# Patient Record
Sex: FEMALE | Race: BLACK OR AFRICAN AMERICAN | NOT HISPANIC OR LATINO | URBAN - METROPOLITAN AREA
[De-identification: names, ages, dates, MRNs, and addresses within clinical notes are randomized per-mention and may not be internally consistent; named-entity substitution may affect disease eponyms.]

---

## 2019-04-22 ENCOUNTER — OUTPATIENT (OUTPATIENT)
Dept: OUTPATIENT SERVICES | Facility: HOSPITAL | Age: 49
LOS: 1 days | End: 2019-04-22

## 2019-04-22 VITALS
HEIGHT: 63.5 IN | TEMPERATURE: 98 F | RESPIRATION RATE: 16 BRPM | DIASTOLIC BLOOD PRESSURE: 76 MMHG | WEIGHT: 235.01 LBS | SYSTOLIC BLOOD PRESSURE: 120 MMHG | HEART RATE: 68 BPM

## 2019-04-22 DIAGNOSIS — K43.2 INCISIONAL HERNIA WITHOUT OBSTRUCTION OR GANGRENE: ICD-10-CM

## 2019-04-22 DIAGNOSIS — Z98.890 OTHER SPECIFIED POSTPROCEDURAL STATES: Chronic | ICD-10-CM

## 2019-04-22 DIAGNOSIS — Z98.891 HISTORY OF UTERINE SCAR FROM PREVIOUS SURGERY: Chronic | ICD-10-CM

## 2019-04-22 LAB
ANION GAP SERPL CALC-SCNC: 10 MMO/L — SIGNIFICANT CHANGE UP (ref 7–14)
BLD GP AB SCN SERPL QL: NEGATIVE — SIGNIFICANT CHANGE UP
BUN SERPL-MCNC: 8 MG/DL — SIGNIFICANT CHANGE UP (ref 7–23)
CALCIUM SERPL-MCNC: 9.4 MG/DL — SIGNIFICANT CHANGE UP (ref 8.4–10.5)
CHLORIDE SERPL-SCNC: 104 MMOL/L — SIGNIFICANT CHANGE UP (ref 98–107)
CO2 SERPL-SCNC: 25 MMOL/L — SIGNIFICANT CHANGE UP (ref 22–31)
CREAT SERPL-MCNC: 0.86 MG/DL — SIGNIFICANT CHANGE UP (ref 0.5–1.3)
GLUCOSE SERPL-MCNC: 91 MG/DL — SIGNIFICANT CHANGE UP (ref 70–99)
HCG SERPL-ACNC: < 5 MIU/ML — SIGNIFICANT CHANGE UP
HCT VFR BLD CALC: 38.7 % — SIGNIFICANT CHANGE UP (ref 34.5–45)
HGB BLD-MCNC: 11.9 G/DL — SIGNIFICANT CHANGE UP (ref 11.5–15.5)
MCHC RBC-ENTMCNC: 25.9 PG — LOW (ref 27–34)
MCHC RBC-ENTMCNC: 30.7 % — LOW (ref 32–36)
MCV RBC AUTO: 84.1 FL — SIGNIFICANT CHANGE UP (ref 80–100)
NRBC # FLD: 0 K/UL — SIGNIFICANT CHANGE UP (ref 0–0)
PLATELET # BLD AUTO: 223 K/UL — SIGNIFICANT CHANGE UP (ref 150–400)
PMV BLD: 11.9 FL — SIGNIFICANT CHANGE UP (ref 7–13)
POTASSIUM SERPL-MCNC: 4 MMOL/L — SIGNIFICANT CHANGE UP (ref 3.5–5.3)
POTASSIUM SERPL-SCNC: 4 MMOL/L — SIGNIFICANT CHANGE UP (ref 3.5–5.3)
RBC # BLD: 4.6 M/UL — SIGNIFICANT CHANGE UP (ref 3.8–5.2)
RBC # FLD: 13.4 % — SIGNIFICANT CHANGE UP (ref 10.3–14.5)
RH IG SCN BLD-IMP: POSITIVE — SIGNIFICANT CHANGE UP
SODIUM SERPL-SCNC: 139 MMOL/L — SIGNIFICANT CHANGE UP (ref 135–145)
WBC # BLD: 5.48 K/UL — SIGNIFICANT CHANGE UP (ref 3.8–10.5)
WBC # FLD AUTO: 5.48 K/UL — SIGNIFICANT CHANGE UP (ref 3.8–10.5)

## 2019-04-22 NOTE — H&P PST ADULT - NEGATIVE ENMT SYMPTOMS
no ear pain/no tinnitus/no throat pain/no sinus symptoms/no dysphagia/no vertigo/no nasal congestion/no hearing difficulty

## 2019-04-22 NOTE — H&P PST ADULT - HISTORY OF PRESENT ILLNESS
Pt is a 49 yr old female scheduled for Abdominal Wall Component Separation Repair wtih Mesh and flap with Dr Lazcano and Dr Martin for c/o of recurring incisional hernia . Pt had hernia repair 4/18 and noted pain that started 8/18 with bulging and increased pain 12/18. Pt now to have surgery for repeat repair.

## 2019-04-22 NOTE — H&P PST ADULT - GASTROINTESTINAL COMMENTS
Pt c/o of abdominal pain r/t incisional hernia since 8/18 with repair 4/18 - pt states bulging since 12/18 and increased pain Pt c/o of slight discomfort with palpation and pressure over surgical incision / umbilical region. Pt c/o of abdominal pain r/t incisional hernia since 8/18 with repair 4/18 - pt states bulging since 12/18 and increased pain- pt denies GI symptoms

## 2019-04-22 NOTE — H&P PST ADULT - NSICDXPASTMEDICALHX_GEN_ALL_CORE_FT
PAST MEDICAL HISTORY:  Incisional hernia     Obesity PAST MEDICAL HISTORY:  Fibroid uterus     Incisional hernia     Obesity

## 2019-04-22 NOTE — H&P PST ADULT - NEGATIVE NEUROLOGICAL SYMPTOMS
no focal seizures/no paresthesias/no syncope/no transient paralysis/no weakness/no generalized seizures

## 2019-04-22 NOTE — H&P PST ADULT - NSICDXPROBLEM_GEN_ALL_CORE_FT
PROBLEM DIAGNOSES  Problem: Incisional hernia  Assessment and Plan: Pt scheduled for surgery and verbalized understanding of preop instructions for Famotidine and Chlorhexidine  OR Booking notified of risk for sleep apnea and mesh and breast marker  Pt to see PCP for MC as per surgeon

## 2019-05-01 ENCOUNTER — TRANSCRIPTION ENCOUNTER (OUTPATIENT)
Age: 49
End: 2019-05-01

## 2019-05-02 ENCOUNTER — RESULT REVIEW (OUTPATIENT)
Age: 49
End: 2019-05-02

## 2019-05-02 ENCOUNTER — INPATIENT (INPATIENT)
Facility: HOSPITAL | Age: 49
LOS: 4 days | Discharge: HOME | End: 2019-05-07
Attending: SPECIALIST | Admitting: SPECIALIST
Payer: COMMERCIAL

## 2019-05-02 VITALS
HEIGHT: 63.5 IN | RESPIRATION RATE: 16 BRPM | OXYGEN SATURATION: 98 % | HEART RATE: 58 BPM | TEMPERATURE: 99 F | SYSTOLIC BLOOD PRESSURE: 126 MMHG | DIASTOLIC BLOOD PRESSURE: 72 MMHG | WEIGHT: 235.01 LBS

## 2019-05-02 DIAGNOSIS — Z98.890 OTHER SPECIFIED POSTPROCEDURAL STATES: Chronic | ICD-10-CM

## 2019-05-02 DIAGNOSIS — S31.105S UNSPECIFIED OPEN WOUND OF ABDOMINAL WALL, PERIUMBILIC REGION WITHOUT PENETRATION INTO PERITONEAL CAVITY, SEQUELA: ICD-10-CM

## 2019-05-02 DIAGNOSIS — Z98.891 HISTORY OF UTERINE SCAR FROM PREVIOUS SURGERY: Chronic | ICD-10-CM

## 2019-05-02 PROBLEM — K43.2 INCISIONAL HERNIA WITHOUT OBSTRUCTION OR GANGRENE: Chronic | Status: ACTIVE | Noted: 2019-04-22

## 2019-05-02 PROBLEM — E66.9 OBESITY, UNSPECIFIED: Chronic | Status: ACTIVE | Noted: 2019-04-22

## 2019-05-02 PROBLEM — D25.9 LEIOMYOMA OF UTERUS, UNSPECIFIED: Chronic | Status: ACTIVE | Noted: 2019-04-22

## 2019-05-02 LAB
HCG UR QL: NEGATIVE — SIGNIFICANT CHANGE UP
RH IG SCN BLD-IMP: POSITIVE — SIGNIFICANT CHANGE UP

## 2019-05-02 PROCEDURE — 88302 TISSUE EXAM BY PATHOLOGIST: CPT | Mod: 26

## 2019-05-02 RX ORDER — ACETAMINOPHEN 500 MG
1000 TABLET ORAL ONCE
Qty: 0 | Refills: 0 | Status: COMPLETED | OUTPATIENT
Start: 2019-05-03 | End: 2019-05-03

## 2019-05-02 RX ORDER — ACETAMINOPHEN 500 MG
1000 TABLET ORAL ONCE
Qty: 0 | Refills: 0 | Status: COMPLETED | OUTPATIENT
Start: 2019-05-02 | End: 2019-05-02

## 2019-05-02 RX ORDER — MORPHINE SULFATE 50 MG/1
2 CAPSULE, EXTENDED RELEASE ORAL EVERY 4 HOURS
Qty: 0 | Refills: 0 | Status: DISCONTINUED | OUTPATIENT
Start: 2019-05-02 | End: 2019-05-03

## 2019-05-02 RX ORDER — HYDROMORPHONE HYDROCHLORIDE 2 MG/ML
0.25 INJECTION INTRAMUSCULAR; INTRAVENOUS; SUBCUTANEOUS
Qty: 0 | Refills: 0 | Status: DISCONTINUED | OUTPATIENT
Start: 2019-05-02 | End: 2019-05-02

## 2019-05-02 RX ORDER — ENOXAPARIN SODIUM 100 MG/ML
40 INJECTION SUBCUTANEOUS EVERY 24 HOURS
Qty: 0 | Refills: 0 | Status: DISCONTINUED | OUTPATIENT
Start: 2019-05-02 | End: 2019-05-07

## 2019-05-02 RX ORDER — CEFOTETAN DISODIUM 1 G
2 VIAL (EA) INJECTION EVERY 12 HOURS
Qty: 0 | Refills: 0 | Status: DISCONTINUED | OUTPATIENT
Start: 2019-05-02 | End: 2019-05-07

## 2019-05-02 RX ORDER — SODIUM CHLORIDE 9 MG/ML
1000 INJECTION, SOLUTION INTRAVENOUS
Qty: 0 | Refills: 0 | Status: DISCONTINUED | OUTPATIENT
Start: 2019-05-02 | End: 2019-05-04

## 2019-05-02 RX ADMIN — MORPHINE SULFATE 2 MILLIGRAM(S): 50 CAPSULE, EXTENDED RELEASE ORAL at 21:41

## 2019-05-02 RX ADMIN — HYDROMORPHONE HYDROCHLORIDE 0.25 MILLIGRAM(S): 2 INJECTION INTRAMUSCULAR; INTRAVENOUS; SUBCUTANEOUS at 20:30

## 2019-05-02 RX ADMIN — HYDROMORPHONE HYDROCHLORIDE 0.25 MILLIGRAM(S): 2 INJECTION INTRAMUSCULAR; INTRAVENOUS; SUBCUTANEOUS at 20:45

## 2019-05-02 RX ADMIN — HYDROMORPHONE HYDROCHLORIDE 0.25 MILLIGRAM(S): 2 INJECTION INTRAMUSCULAR; INTRAVENOUS; SUBCUTANEOUS at 20:15

## 2019-05-02 RX ADMIN — MORPHINE SULFATE 2 MILLIGRAM(S): 50 CAPSULE, EXTENDED RELEASE ORAL at 21:56

## 2019-05-02 RX ADMIN — ENOXAPARIN SODIUM 40 MILLIGRAM(S): 100 INJECTION SUBCUTANEOUS at 23:49

## 2019-05-02 RX ADMIN — Medication 400 MILLIGRAM(S): at 23:50

## 2019-05-02 NOTE — PROGRESS NOTE ADULT - SUBJECTIVE AND OBJECTIVE BOX
Surgery Post-Operative Note    Procedure: Repair of incisional hernia using mesh  Reconstruction of abdominal wall using flap    Subjective: Patient examined at bedside. Reports having some pain over R breast, tender to palpation, also reports SOB, O2 sat: 98%. No nausea/vomiting. No other complaints at this time.    Vital Signs Last 24 Hrs  T(C): 36.9 (02 May 2019 23:00), Max: 37.3 (02 May 2019 11:35)  T(F): 98.4 (02 May 2019 23:00), Max: 99.1 (02 May 2019 11:35)  HR: 70 (02 May 2019 23:00) (58 - 83)  BP: 144/77 (02 May 2019 23:00) (109/73 - 144/77)  BP(mean): 84 (02 May 2019 22:00) (80 - 95)  RR: 20 (02 May 2019 23:00) (9 - 25)  SpO2: 98% (02 May 2019 23:00) (95% - 100%)    Physical Exam:  General: NAD, resting comfortably in bed  Pulmonary: Nonlabored breathing, no respiratory distress  Cardiovascular: NSR  Abdominal: soft, ND, appropriately tender to palpation, no RT, no guarding, prevena vac in place, 2 flank drains on each side, 1 drain on L side (medial) all with SS output  : Coello in place with clear urine    LABS:            CAPILLARY BLOOD GLUCOSE            ABO Interpretation: A (05-02 @ 11:51)        Radiology and Additional Studies:

## 2019-05-02 NOTE — BRIEF OPERATIVE NOTE - NSICDXBRIEFPROCEDURE_GEN_ALL_CORE_FT
PROCEDURES:  Reconstruction of abdominal wall using flap 02-May-2019 15:13:55 Bilateral component separation and skin advancement flaps Geneva Child
PROCEDURES:  Repair of incisional hernia using mesh 02-May-2019 19:04:55  Alize Friedman

## 2019-05-02 NOTE — BRIEF OPERATIVE NOTE - NSICDXBRIEFPOSTOP_GEN_ALL_CORE_FT
POST-OP DIAGNOSIS:  Recurrent incisional hernia 02-May-2019 15:14:29  Geneva Child
POST-OP DIAGNOSIS:  Recurrent incisional hernia 02-May-2019 15:14:29  Geneva Child

## 2019-05-02 NOTE — BRIEF OPERATIVE NOTE - NSICDXBRIEFPREOP_GEN_ALL_CORE_FT
PRE-OP DIAGNOSIS:  Recurrent incisional hernia 02-May-2019 15:14:20  Geneva Child
PRE-OP DIAGNOSIS:  Recurrent incisional hernia 02-May-2019 15:14:20  Geneva Child
WDL

## 2019-05-02 NOTE — BRIEF OPERATIVE NOTE - OPERATION/FINDINGS
Incisional hernia dissected out, approx 15 cm diameter. Hernia sac with adherent small bowel to previous mesh was carefully . 2 serosal tears were repaired with interrupted 3-0 silk sutures. An enterotomy was fixed in two layers with 3-0 Chromic and an outer layer of interrupted 3-0 silk lemberts. It was decided to close the abdomen with Strattice in a retrorectus repair. The posterior sheath was closed with #1 Prolene. 14 cm x 25 cm piece of Strattice was laid on the posterior sheath and sutured in place with interrupted #1 Prolene U-stitches. The anterior sheath was closed on top. A single drain was left in the subfascial space. 2 JPs were placed in the subcutaneous space. An advancement flap was made to close the skin.

## 2019-05-02 NOTE — BRIEF OPERATIVE NOTE - OPERATION/FINDINGS
see op note Incisional hernia repair. Lysis of adhesions. B/l components separation. Umbilicoplasty with bi-pedicaled flat, abdominal closure with ariadne de lis skin advancement flap

## 2019-05-02 NOTE — PROGRESS NOTE ADULT - ASSESSMENT
Assessment:49y Female s/p repair of incisional hernia using mesh and reconstruction of abdominal wall using flap (5/2)      Plan:  - EKG  - Pain control   - NPO/IVF  - F/u drain output  - Monitor abdominal exam  - OOB as tolerated    A Team Surgery, [id number] Assessment:49y Female w/ hx of hernia repair 4/18 now s/p revision repair of incisional hernia using mesh and reconstruction of abdominal wall using flap (5/2)      Plan:  - EKG  - Pain control   - NPO/IVF  - F/u drain output  - Monitor abdominal exam  - OOB as tolerated    A Team Surgery, [id number]

## 2019-05-03 LAB
ANION GAP SERPL CALC-SCNC: 12 MMO/L — SIGNIFICANT CHANGE UP (ref 7–14)
BASOPHILS # BLD AUTO: 0.02 K/UL — SIGNIFICANT CHANGE UP (ref 0–0.2)
BASOPHILS NFR BLD AUTO: 0.2 % — SIGNIFICANT CHANGE UP (ref 0–2)
BUN SERPL-MCNC: 9 MG/DL — SIGNIFICANT CHANGE UP (ref 7–23)
CALCIUM SERPL-MCNC: 8.5 MG/DL — SIGNIFICANT CHANGE UP (ref 8.4–10.5)
CHLORIDE SERPL-SCNC: 102 MMOL/L — SIGNIFICANT CHANGE UP (ref 98–107)
CO2 SERPL-SCNC: 23 MMOL/L — SIGNIFICANT CHANGE UP (ref 22–31)
CREAT SERPL-MCNC: 0.87 MG/DL — SIGNIFICANT CHANGE UP (ref 0.5–1.3)
EOSINOPHIL # BLD AUTO: 0 K/UL — SIGNIFICANT CHANGE UP (ref 0–0.5)
EOSINOPHIL NFR BLD AUTO: 0 % — SIGNIFICANT CHANGE UP (ref 0–6)
GLUCOSE SERPL-MCNC: 136 MG/DL — HIGH (ref 70–99)
HCT VFR BLD CALC: 34.6 % — SIGNIFICANT CHANGE UP (ref 34.5–45)
HGB BLD-MCNC: 11 G/DL — LOW (ref 11.5–15.5)
IMM GRANULOCYTES NFR BLD AUTO: 0.5 % — SIGNIFICANT CHANGE UP (ref 0–1.5)
LYMPHOCYTES # BLD AUTO: 1.33 K/UL — SIGNIFICANT CHANGE UP (ref 1–3.3)
LYMPHOCYTES # BLD AUTO: 10.3 % — LOW (ref 13–44)
MAGNESIUM SERPL-MCNC: 1.8 MG/DL — SIGNIFICANT CHANGE UP (ref 1.6–2.6)
MCHC RBC-ENTMCNC: 26.2 PG — LOW (ref 27–34)
MCHC RBC-ENTMCNC: 31.8 % — LOW (ref 32–36)
MCV RBC AUTO: 82.4 FL — SIGNIFICANT CHANGE UP (ref 80–100)
MONOCYTES # BLD AUTO: 0.58 K/UL — SIGNIFICANT CHANGE UP (ref 0–0.9)
MONOCYTES NFR BLD AUTO: 4.5 % — SIGNIFICANT CHANGE UP (ref 2–14)
NEUTROPHILS # BLD AUTO: 10.95 K/UL — HIGH (ref 1.8–7.4)
NEUTROPHILS NFR BLD AUTO: 84.5 % — HIGH (ref 43–77)
NRBC # FLD: 0 K/UL — SIGNIFICANT CHANGE UP (ref 0–0)
PHOSPHATE SERPL-MCNC: 2.6 MG/DL — SIGNIFICANT CHANGE UP (ref 2.5–4.5)
PLATELET # BLD AUTO: 197 K/UL — SIGNIFICANT CHANGE UP (ref 150–400)
PMV BLD: 12 FL — SIGNIFICANT CHANGE UP (ref 7–13)
POTASSIUM SERPL-MCNC: 3.9 MMOL/L — SIGNIFICANT CHANGE UP (ref 3.5–5.3)
POTASSIUM SERPL-SCNC: 3.9 MMOL/L — SIGNIFICANT CHANGE UP (ref 3.5–5.3)
RBC # BLD: 4.2 M/UL — SIGNIFICANT CHANGE UP (ref 3.8–5.2)
RBC # FLD: 13.8 % — SIGNIFICANT CHANGE UP (ref 10.3–14.5)
SODIUM SERPL-SCNC: 137 MMOL/L — SIGNIFICANT CHANGE UP (ref 135–145)
WBC # BLD: 12.94 K/UL — HIGH (ref 3.8–10.5)
WBC # FLD AUTO: 12.94 K/UL — HIGH (ref 3.8–10.5)

## 2019-05-03 PROCEDURE — 93010 ELECTROCARDIOGRAM REPORT: CPT

## 2019-05-03 RX ORDER — ACETAMINOPHEN 500 MG
1000 TABLET ORAL ONCE
Qty: 0 | Refills: 0 | Status: COMPLETED | OUTPATIENT
Start: 2019-05-03 | End: 2019-05-03

## 2019-05-03 RX ORDER — MORPHINE SULFATE 50 MG/1
4 CAPSULE, EXTENDED RELEASE ORAL EVERY 4 HOURS
Qty: 0 | Refills: 0 | Status: DISCONTINUED | OUTPATIENT
Start: 2019-05-03 | End: 2019-05-04

## 2019-05-03 RX ORDER — ACETAMINOPHEN 500 MG
1000 TABLET ORAL ONCE
Qty: 0 | Refills: 0 | Status: COMPLETED | OUTPATIENT
Start: 2019-05-04 | End: 2019-05-04

## 2019-05-03 RX ORDER — HYDROMORPHONE HYDROCHLORIDE 2 MG/ML
0.5 INJECTION INTRAMUSCULAR; INTRAVENOUS; SUBCUTANEOUS ONCE
Qty: 0 | Refills: 0 | Status: DISCONTINUED | OUTPATIENT
Start: 2019-05-03 | End: 2019-05-03

## 2019-05-03 RX ADMIN — MORPHINE SULFATE 2 MILLIGRAM(S): 50 CAPSULE, EXTENDED RELEASE ORAL at 09:42

## 2019-05-03 RX ADMIN — MORPHINE SULFATE 2 MILLIGRAM(S): 50 CAPSULE, EXTENDED RELEASE ORAL at 10:21

## 2019-05-03 RX ADMIN — MORPHINE SULFATE 4 MILLIGRAM(S): 50 CAPSULE, EXTENDED RELEASE ORAL at 20:03

## 2019-05-03 RX ADMIN — ENOXAPARIN SODIUM 40 MILLIGRAM(S): 100 INJECTION SUBCUTANEOUS at 23:07

## 2019-05-03 RX ADMIN — MORPHINE SULFATE 2 MILLIGRAM(S): 50 CAPSULE, EXTENDED RELEASE ORAL at 14:45

## 2019-05-03 RX ADMIN — Medication 100 GRAM(S): at 17:19

## 2019-05-03 RX ADMIN — Medication 400 MILLIGRAM(S): at 23:07

## 2019-05-03 RX ADMIN — MORPHINE SULFATE 2 MILLIGRAM(S): 50 CAPSULE, EXTENDED RELEASE ORAL at 05:40

## 2019-05-03 RX ADMIN — HYDROMORPHONE HYDROCHLORIDE 0.5 MILLIGRAM(S): 2 INJECTION INTRAMUSCULAR; INTRAVENOUS; SUBCUTANEOUS at 15:00

## 2019-05-03 RX ADMIN — Medication 100 GRAM(S): at 05:27

## 2019-05-03 RX ADMIN — MORPHINE SULFATE 2 MILLIGRAM(S): 50 CAPSULE, EXTENDED RELEASE ORAL at 14:18

## 2019-05-03 RX ADMIN — Medication 1000 MILLIGRAM(S): at 00:20

## 2019-05-03 RX ADMIN — MORPHINE SULFATE 2 MILLIGRAM(S): 50 CAPSULE, EXTENDED RELEASE ORAL at 05:24

## 2019-05-03 RX ADMIN — Medication 400 MILLIGRAM(S): at 17:25

## 2019-05-03 RX ADMIN — SODIUM CHLORIDE 100 MILLILITER(S): 9 INJECTION, SOLUTION INTRAVENOUS at 11:31

## 2019-05-03 RX ADMIN — HYDROMORPHONE HYDROCHLORIDE 0.5 MILLIGRAM(S): 2 INJECTION INTRAMUSCULAR; INTRAVENOUS; SUBCUTANEOUS at 14:35

## 2019-05-03 RX ADMIN — Medication 400 MILLIGRAM(S): at 06:39

## 2019-05-03 RX ADMIN — Medication 1000 MILLIGRAM(S): at 07:09

## 2019-05-03 RX ADMIN — Medication 1000 MILLIGRAM(S): at 12:00

## 2019-05-03 RX ADMIN — Medication 1000 MILLIGRAM(S): at 23:37

## 2019-05-03 RX ADMIN — Medication 400 MILLIGRAM(S): at 11:21

## 2019-05-03 RX ADMIN — Medication 1000 MILLIGRAM(S): at 18:00

## 2019-05-03 NOTE — PROGRESS NOTE ADULT - ASSESSMENT
Poorly controlled post-operative pain - S/P repair of large incisional hernia.  Will require an additional day of hospital stay  To monitor response to narcotic - may need increase in dose.  To advance diet in the morning.

## 2019-05-03 NOTE — PHYSICAL THERAPY INITIAL EVALUATION ADULT - DISCHARGE DISPOSITION, PT EVAL
Anticipate Restorative Rehab to improve functional mobility and strength and to return to baseline functional status.

## 2019-05-03 NOTE — PATIENT PROFILE ADULT - SURGICAL SITE DESCRIPTION
Assessment/Plan:    Osteoarthritis of right acromioclavicular joint  This continues to have some symptoms  Would recommend follow-up with Orthopedics  Hypertension  Blood pressure today is reasonable, no changes  Follow-up in the future  Hyperlipidemia  Cholesterol is not at goal   Would recommend limit carbohydrate and cholesterol intake  Patient did not wish to take statins  Will re-evaluate in approximately 1 year  Elevated prostate specific antigen (PSA)  PSA is improved on testing 6 months after initial   Recommend recheck in 6 months, and SINDY at that time  Hyperglycemia  Blood sugar was only minimally elevated  Would recommend limit carbohydrate intake, continue exercise that he is currently doing, follow-up in 1 year  Diagnoses and all orders for this visit:    Osteoarthritis of right acromioclavicular joint    Mixed hyperlipidemia  -     Comprehensive metabolic panel; Future  -     Lipid panel; Future    Essential hypertension  -     Comprehensive metabolic panel; Future    Elevated prostate specific antigen (PSA)  -     PSA Total, Diagnostic; Future    Ankle swelling, left  Comments:  Gone now  No signs of DVT or circulation issues  Follow in future  Screen for colon cancer  -     Ambulatory referral to Gastroenterology; Future    Hyperglycemia          Subjective: Follow up to chronic conditions and review bw results  Continues with shoulder pain  Sees Ortho  Also ankle was swollen last week and took his wife's water pill to help   mjs     Patient ID: Cecily Guerra is a 74 y o  male  He has had some left ankle swelling, so took Lasix, and that improved a bit  It seemed to decrease after the lasix for 2 days  Has not happened before that he recalls, but maybe one other time  Cold symptoms last week, but his throat was OK with this  Shoulder pain: Still seeing Ortho  Has had injections  Shoulder has AC arthritis, and subacromial impingement      PSA: Had repeat test after slight rise 6 months ago  First cholesterol, the patient is off statins  He did not wish to continue them previously  He did mention that he had some increased sweets in takes recently  The following portions of the patient's history were reviewed and updated as appropriate: allergies, current medications, past family history, past medical history, past social history, past surgical history and problem list     Review of Systems   HENT: Negative  Respiratory: Negative  Cardiovascular: Negative  Musculoskeletal:        Per HPI  All other systems reviewed and are negative  Laboratory studies reviewed  Please see copies on the chart  Sodium and potassium normal   Creatinine 1 66, GFR 61  Blood sugar 101  AST 13, ALT 26  Total cholesterol 235, , HDL 53, triglycerides 138  PSA 1 8  Objective:      /84   Pulse 76   Ht 5' 8" (1 727 m)   Wt 77 1 kg (170 lb)   BMI 25 85 kg/m²          Physical Exam   Constitutional: He appears well-developed and well-nourished  HENT:   Head: Normocephalic and atraumatic  Neck: Normal range of motion  Neck supple  Cardiovascular: Normal rate, regular rhythm and normal heart sounds  Exam reveals no gallop and no friction rub  No murmur heard  Pulses:       Carotid pulses are 2+ on the right side, and 2+ on the left side  Dorsalis pedis pulses are 2+ on the left side  Posterior tibial pulses are 2+ on the left side  Pulmonary/Chest: Effort normal and breath sounds normal  No respiratory distress  He has no wheezes  He has no rales  Nursing note and vitals reviewed  transverse abd black foam purvina dressing with tegaderm

## 2019-05-03 NOTE — PROGRESS NOTE ADULT - SUBJECTIVE AND OBJECTIVE BOX
Surgery Progress Note    Subjective: Patient examined at bedside. Pain is well controlled. No nausea/vomiting. No other complaints at this time.    Vital Signs Last 24 Hrs  T(C): 36.9 (02 May 2019 23:00), Max: 37.3 (02 May 2019 11:35)  T(F): 98.4 (02 May 2019 23:00), Max: 99.1 (02 May 2019 11:35)  HR: 70 (02 May 2019 23:00) (58 - 83)  BP: 144/77 (02 May 2019 23:00) (109/73 - 144/77)  BP(mean): 84 (02 May 2019 22:00) (80 - 95)  RR: 20 (02 May 2019 23:00) (9 - 25)  SpO2: 98% (02 May 2019 23:00) (95% - 100%)    Physical Exam:  General: NAD, resting comfortably in bed  Pulmonary: Nonlabored breathing, no respiratory distress  Cardiovascular: NSR  Abdominal: soft, ND, appropriately tender to palpation, no RT, no guarding, prevena vac in place, 2 flank drains on each side, 1 drain on L side (medial) all with SS output  : Coello in place with clear urine    LABS:            CAPILLARY BLOOD GLUCOSE            ABO Interpretation: A (05-02 @ 11:51)        Radiology and Additional Studies:

## 2019-05-03 NOTE — PROGRESS NOTE ADULT - ASSESSMENT
Assessment:49y Female w/ hx of hernia repair 4/18 now s/p revision repair of incisional hernia using mesh and reconstruction of abdominal wall using flap (5/2)      Plan:  - Pain control   - NPO/IVF  - Coello  - F/u drain output  - Monitor abdominal exam  - OOB as tolerated    A Team Surgery, [id number] Assessment:49y Female w/ hx of hernia repair 4/18 now s/p revision repair of incisional hernia using mesh and reconstruction of abdominal wall using flap (5/2)      Plan:  - Pain control   - NPO/IVF  - Coello  - F/u drain output  - Antibiotics until discharge  - Monitor abdominal exam  - OOB as tolerated    A Team Surgery, [id number]

## 2019-05-03 NOTE — PROGRESS NOTE ADULT - SUBJECTIVE AND OBJECTIVE BOX
NAD, c/o post op pain  AVSS  PE: closure intact       VAC intact       Umbo recon Viable       drains- Serosang output  Plan: OOB- Ambulate          d/c baeza          cont drains          cont Binder          d/c Home with VAC dressing          D/w Dr. Martin

## 2019-05-03 NOTE — PROGRESS NOTE ADULT - SUBJECTIVE AND OBJECTIVE BOX
S/P Explantation of Composix mesh, Extensive lysis of adhesions, Repair of incisional hernia with abdominal wall component separation.    POD#1        Subjective: I am having severe incisional and supra-pubic pain.  I have not passed any flatus but i was able to tolerate clear liquids.    Objective:   Vital Signs Last 24 Hrs  T(C): 36.9 (03 May 2019 10:22), Max: 37.2 (03 May 2019 05:23)  T(F): 98.4 (03 May 2019 10:22), Max: 98.9 (03 May 2019 05:23)  HR: 85 (03 May 2019 10:22) (70 - 85)  BP: 117/72 (03 May 2019 10:22) (108/66 - 144/77)  BP(mean): 84 (02 May 2019 22:00) (80 - 95)  RR: 17 (03 May 2019 10:22) (9 - 25)  SpO2: 96% (03 May 2019 10:22) (95% - 100%)    Daily     Daily     Heent: N/C, AT PER no scleral icterus, No JVD  Pul: clear  Cor: RRR  Abdomen: soft, decreased BS. Wound - clean with binder in place.  Extremities: without edema, motor/sensory intact    I&O's Detail    02 May 2019 07:01  -  03 May 2019 07:00  --------------------------------------------------------  IN:    lactated ringers.: 700 mL  Total IN: 700 mL    OUT:    Bulb: 25 mL    Bulb: 33 mL    Bulb: 70 mL    Indwelling Catheter - Urethral: 240 mL  Total OUT: 368 mL    Total NET: 332 mL      03 May 2019 07:01  -  03 May 2019 14:32  --------------------------------------------------------  IN:  Total IN: 0 mL    OUT:    Bulb: 5 mL    Bulb: 15 mL    Bulb: 20 mL    Voided: 200 mL  Total OUT: 240 mL    Total NET: -240 mL          MEDICATIONS  (STANDING):  acetaminophen  IVPB .. 1000 milliGRAM(s) IV Intermittent once  acetaminophen  IVPB .. 1000 milliGRAM(s) IV Intermittent once  cefoTEtan  IVPB 2 Gram(s) IV Intermittent every 12 hours  enoxaparin Injectable 40 milliGRAM(s) SubCutaneous every 24 hours  HYDROmorphone  Injectable 0.5 milliGRAM(s) IV Push once  lactated ringers. 1000 milliLiter(s) (100 mL/Hr) IV Continuous <Continuous>    MEDICATIONS  (PRN):  morphine  - Injectable 2 milliGRAM(s) IV Push every 4 hours PRN Moderate Pain (4 - 6)                            11.0   12.94 )-----------( 197      ( 03 May 2019 06:00 )             34.6     05-03    137  |  102  |  9   ----------------------------<  136<H>  3.9   |  23  |  0.87    Ca    8.5      03 May 2019 06:00  Phos  2.6     05-03  Mg     1.8     05-03          Radiologic Studies:

## 2019-05-04 LAB
ANION GAP SERPL CALC-SCNC: 9 MMO/L — SIGNIFICANT CHANGE UP (ref 7–14)
BUN SERPL-MCNC: 5 MG/DL — LOW (ref 7–23)
CALCIUM SERPL-MCNC: 9.1 MG/DL — SIGNIFICANT CHANGE UP (ref 8.4–10.5)
CHLORIDE SERPL-SCNC: 105 MMOL/L — SIGNIFICANT CHANGE UP (ref 98–107)
CO2 SERPL-SCNC: 23 MMOL/L — SIGNIFICANT CHANGE UP (ref 22–31)
CREAT SERPL-MCNC: 0.88 MG/DL — SIGNIFICANT CHANGE UP (ref 0.5–1.3)
GLUCOSE SERPL-MCNC: 122 MG/DL — HIGH (ref 70–99)
HCT VFR BLD CALC: 32.9 % — LOW (ref 34.5–45)
HGB BLD-MCNC: 10.4 G/DL — LOW (ref 11.5–15.5)
MAGNESIUM SERPL-MCNC: 1.8 MG/DL — SIGNIFICANT CHANGE UP (ref 1.6–2.6)
MCHC RBC-ENTMCNC: 26.5 PG — LOW (ref 27–34)
MCHC RBC-ENTMCNC: 31.6 % — LOW (ref 32–36)
MCV RBC AUTO: 83.7 FL — SIGNIFICANT CHANGE UP (ref 80–100)
NRBC # FLD: 0 K/UL — SIGNIFICANT CHANGE UP (ref 0–0)
PHOSPHATE SERPL-MCNC: 1.8 MG/DL — LOW (ref 2.5–4.5)
PLATELET # BLD AUTO: 173 K/UL — SIGNIFICANT CHANGE UP (ref 150–400)
PMV BLD: 11.8 FL — SIGNIFICANT CHANGE UP (ref 7–13)
POTASSIUM SERPL-MCNC: 3.6 MMOL/L — SIGNIFICANT CHANGE UP (ref 3.5–5.3)
POTASSIUM SERPL-SCNC: 3.6 MMOL/L — SIGNIFICANT CHANGE UP (ref 3.5–5.3)
RBC # BLD: 3.93 M/UL — SIGNIFICANT CHANGE UP (ref 3.8–5.2)
RBC # FLD: 13.9 % — SIGNIFICANT CHANGE UP (ref 10.3–14.5)
SODIUM SERPL-SCNC: 137 MMOL/L — SIGNIFICANT CHANGE UP (ref 135–145)
WBC # BLD: 9.54 K/UL — SIGNIFICANT CHANGE UP (ref 3.8–10.5)
WBC # FLD AUTO: 9.54 K/UL — SIGNIFICANT CHANGE UP (ref 3.8–10.5)

## 2019-05-04 RX ORDER — ONDANSETRON 8 MG/1
4 TABLET, FILM COATED ORAL ONCE
Qty: 0 | Refills: 0 | Status: DISCONTINUED | OUTPATIENT
Start: 2019-05-04 | End: 2019-05-07

## 2019-05-04 RX ORDER — SODIUM CHLORIDE 9 MG/ML
1000 INJECTION, SOLUTION INTRAVENOUS
Qty: 0 | Refills: 0 | Status: DISCONTINUED | OUTPATIENT
Start: 2019-05-04 | End: 2019-05-04

## 2019-05-04 RX ORDER — POTASSIUM PHOSPHATE, MONOBASIC POTASSIUM PHOSPHATE, DIBASIC 236; 224 MG/ML; MG/ML
30 INJECTION, SOLUTION INTRAVENOUS ONCE
Qty: 0 | Refills: 0 | Status: COMPLETED | OUTPATIENT
Start: 2019-05-04 | End: 2019-05-04

## 2019-05-04 RX ORDER — HYDROMORPHONE HYDROCHLORIDE 2 MG/ML
1 INJECTION INTRAMUSCULAR; INTRAVENOUS; SUBCUTANEOUS EVERY 4 HOURS
Qty: 0 | Refills: 0 | Status: DISCONTINUED | OUTPATIENT
Start: 2019-05-04 | End: 2019-05-06

## 2019-05-04 RX ORDER — ACETAMINOPHEN 500 MG
1000 TABLET ORAL ONCE
Qty: 0 | Refills: 0 | Status: COMPLETED | OUTPATIENT
Start: 2019-05-04 | End: 2019-05-04

## 2019-05-04 RX ORDER — DOCUSATE SODIUM 100 MG
100 CAPSULE ORAL
Qty: 0 | Refills: 0 | Status: DISCONTINUED | OUTPATIENT
Start: 2019-05-04 | End: 2019-05-07

## 2019-05-04 RX ORDER — HYDROMORPHONE HYDROCHLORIDE 2 MG/ML
2 INJECTION INTRAMUSCULAR; INTRAVENOUS; SUBCUTANEOUS EVERY 4 HOURS
Qty: 0 | Refills: 0 | Status: DISCONTINUED | OUTPATIENT
Start: 2019-05-04 | End: 2019-05-06

## 2019-05-04 RX ORDER — ACETAMINOPHEN 500 MG
1000 TABLET ORAL ONCE
Qty: 0 | Refills: 0 | Status: COMPLETED | OUTPATIENT
Start: 2019-05-05 | End: 2019-05-05

## 2019-05-04 RX ORDER — SENNA PLUS 8.6 MG/1
2 TABLET ORAL AT BEDTIME
Qty: 0 | Refills: 0 | Status: DISCONTINUED | OUTPATIENT
Start: 2019-05-04 | End: 2019-05-07

## 2019-05-04 RX ADMIN — HYDROMORPHONE HYDROCHLORIDE 2 MILLIGRAM(S): 2 INJECTION INTRAMUSCULAR; INTRAVENOUS; SUBCUTANEOUS at 23:30

## 2019-05-04 RX ADMIN — HYDROMORPHONE HYDROCHLORIDE 2 MILLIGRAM(S): 2 INJECTION INTRAMUSCULAR; INTRAVENOUS; SUBCUTANEOUS at 23:12

## 2019-05-04 RX ADMIN — MORPHINE SULFATE 4 MILLIGRAM(S): 50 CAPSULE, EXTENDED RELEASE ORAL at 09:37

## 2019-05-04 RX ADMIN — ENOXAPARIN SODIUM 40 MILLIGRAM(S): 100 INJECTION SUBCUTANEOUS at 22:41

## 2019-05-04 RX ADMIN — MORPHINE SULFATE 4 MILLIGRAM(S): 50 CAPSULE, EXTENDED RELEASE ORAL at 14:00

## 2019-05-04 RX ADMIN — Medication 400 MILLIGRAM(S): at 05:28

## 2019-05-04 RX ADMIN — Medication 1000 MILLIGRAM(S): at 06:30

## 2019-05-04 RX ADMIN — HYDROMORPHONE HYDROCHLORIDE 2 MILLIGRAM(S): 2 INJECTION INTRAMUSCULAR; INTRAVENOUS; SUBCUTANEOUS at 18:12

## 2019-05-04 RX ADMIN — MORPHINE SULFATE 4 MILLIGRAM(S): 50 CAPSULE, EXTENDED RELEASE ORAL at 02:14

## 2019-05-04 RX ADMIN — HYDROMORPHONE HYDROCHLORIDE 2 MILLIGRAM(S): 2 INJECTION INTRAMUSCULAR; INTRAVENOUS; SUBCUTANEOUS at 17:49

## 2019-05-04 RX ADMIN — Medication 100 GRAM(S): at 17:49

## 2019-05-04 RX ADMIN — MORPHINE SULFATE 4 MILLIGRAM(S): 50 CAPSULE, EXTENDED RELEASE ORAL at 09:20

## 2019-05-04 RX ADMIN — SENNA PLUS 2 TABLET(S): 8.6 TABLET ORAL at 22:41

## 2019-05-04 RX ADMIN — Medication 400 MILLIGRAM(S): at 14:13

## 2019-05-04 RX ADMIN — Medication 100 MILLIGRAM(S): at 17:49

## 2019-05-04 RX ADMIN — Medication 100 GRAM(S): at 07:00

## 2019-05-04 RX ADMIN — MORPHINE SULFATE 4 MILLIGRAM(S): 50 CAPSULE, EXTENDED RELEASE ORAL at 02:30

## 2019-05-04 RX ADMIN — Medication 1000 MILLIGRAM(S): at 14:25

## 2019-05-04 RX ADMIN — POTASSIUM PHOSPHATE, MONOBASIC POTASSIUM PHOSPHATE, DIBASIC 83.33 MILLIMOLE(S): 236; 224 INJECTION, SOLUTION INTRAVENOUS at 14:12

## 2019-05-04 RX ADMIN — SODIUM CHLORIDE 100 MILLILITER(S): 9 INJECTION, SOLUTION INTRAVENOUS at 09:20

## 2019-05-04 RX ADMIN — MORPHINE SULFATE 4 MILLIGRAM(S): 50 CAPSULE, EXTENDED RELEASE ORAL at 13:21

## 2019-05-04 NOTE — PROGRESS NOTE ADULT - SUBJECTIVE AND OBJECTIVE BOX
GENERAL SURGERY DAILY PROGRESS NOTE:     Subjective:  Pt seen and examined. No acute events overnight. Tolerating clears. REports some nausea associated w/ the pain medications. Pain control improved. Denies emesis. Denies cp/sob. Was oob to chair yesterday w/ PT. Passed TOV.     Objective:    MEDICATIONS  (STANDING):  cefoTEtan  IVPB 2 Gram(s) IV Intermittent every 12 hours  enoxaparin Injectable 40 milliGRAM(s) SubCutaneous every 24 hours  lactated ringers. 1000 milliLiter(s) (100 mL/Hr) IV Continuous <Continuous>  potassium phosphate IVPB 30 milliMole(s) IV Intermittent once    MEDICATIONS  (PRN):  morphine  - Injectable 4 milliGRAM(s) IV Push every 4 hours PRN Moderate Pain (4 - 6)      Vital Signs Last 24 Hrs  T(C): 37.1 (04 May 2019 06:30), Max: 37.5 (03 May 2019 14:39)  T(F): 98.7 (04 May 2019 06:30), Max: 99.5 (03 May 2019 14:39)  HR: 80 (04 May 2019 06:30) (74 - 89)  BP: 127/76 (04 May 2019 06:30) (108/59 - 147/99)  BP(mean): --  RR: 18 (04 May 2019 06:30) (17 - 18)  SpO2: 96% (04 May 2019 06:30) (96% - 100%)    I&O's Detail    03 May 2019 07:01  -  04 May 2019 07:00  --------------------------------------------------------  IN:    lactated ringers.: 400 mL    Oral Fluid: 480 mL  Total IN: 880 mL    OUT:    Bulb: 32.5 mL    Bulb: 67.5 mL    Bulb: 72.5 mL    Voided: 2850 mL  Total OUT: 3022.5 mL    Total NET: -2142.5 mL          Daily     Daily     LABS:                        10.4   9.54  )-----------( 173      ( 04 May 2019 06:05 )             32.9     05-04    137  |  105  |  5<L>  ----------------------------<  122<H>  3.6   |  23  |  0.88    Ca    9.1      04 May 2019 06:05  Phos  1.8     05-04  Mg     1.8     05-04            RADIOLOGY & ADDITIONAL STUDIES:    PHYSICAL EXAM  General: NAD, resting comfortably in bed  Pulmonary: Nonlabored breathing, no respiratory distress  Abdominal: soft, ND, appropriately tender to palpation, no RT, no guarding, prevena vac in place, 2 flank drains on each side, 1 drain on L side (medial) all with SS output, no rebound/guarding

## 2019-05-04 NOTE — PROGRESS NOTE ADULT - ASSESSMENT
Assessment:49y Female w/ hx of hernia repair 4/18 now s/p revision repair of incisional hernia using mesh and reconstruction of abdominal wall using flap (5/2)    Plan:  - Pain control   - CLD  - F/u drain output  - Antibiotics until discharge  - Monitor abdominal exam  - OOB as tolerated  - PT - restorative rehab, f/u subsequent recommendation    A Team Surgery, [id number]

## 2019-05-04 NOTE — PROGRESS NOTE ADULT - SUBJECTIVE AND OBJECTIVE BOX
S/P Repair of incisional hernia with abdominal wall component separation    POD # 2      Subjective: still having significant issues with pain control. Tolerating liquids but still experiencing nausea. No flatus or stool yet.    Objective:   Vital Signs Last 24 Hrs  T(C): 37.1 (04 May 2019 13:10), Max: 37.5 (03 May 2019 14:39)  T(F): 98.7 (04 May 2019 13:10), Max: 99.5 (03 May 2019 14:39)  HR: 96 (04 May 2019 13:10) (80 - 96)  BP: 135/83 (04 May 2019 13:10) (108/59 - 147/99)  BP(mean): --  RR: 13 (04 May 2019 13:10) (13 - 18)  SpO2: 99% (04 May 2019 13:10) (96% - 100%)    Daily     Daily     Heent: N/C, AT PER no scleral icterus, No JVD  Pul: clear  Cor: RRR  Abdomen: soft, normal BS. Wound - clean- JAVED drains with sero-sanguineous drainage  Extremities: without edema, motor/sensory intact    I&O's Detail    03 May 2019 07:01  -  04 May 2019 07:00  --------------------------------------------------------  IN:    lactated ringers.: 400 mL    Oral Fluid: 480 mL  Total IN: 880 mL    OUT:    Bulb: 32.5 mL    Bulb: 67.5 mL    Bulb: 72.5 mL    Voided: 2850 mL  Total OUT: 3022.5 mL    Total NET: -2142.5 mL      04 May 2019 07:01  -  04 May 2019 14:21  --------------------------------------------------------  IN:    lactated ringers.: 120 mL    lactated ringers.: 400 mL    Oral Fluid: 1000 mL  Total IN: 1520 mL    OUT:    Bulb: 17.5 mL    Bulb: 22.5 mL    Bulb: 15 mL    Voided: 1150 mL  Total OUT: 1205 mL    Total NET: 315 mL          MEDICATIONS  (STANDING):  acetaminophen  IVPB .. 1000 milliGRAM(s) IV Intermittent once  cefoTEtan  IVPB 2 Gram(s) IV Intermittent every 12 hours  enoxaparin Injectable 40 milliGRAM(s) SubCutaneous every 24 hours    MEDICATIONS  (PRN):  HYDROmorphone  Injectable 1 milliGRAM(s) IV Push every 4 hours PRN Moderate Pain (4 - 6)  HYDROmorphone  Injectable 2 milliGRAM(s) IV Push every 4 hours PRN Severe Pain (7 - 10)  morphine  - Injectable 4 milliGRAM(s) IV Push every 4 hours PRN Moderate Pain (4 - 6)                            10.4   9.54  )-----------( 173      ( 04 May 2019 06:05 )             32.9     05-04    137  |  105  |  5<L>  ----------------------------<  122<H>  3.6   |  23  |  0.88    Ca    9.1      04 May 2019 06:05  Phos  1.8     05-04  Mg     1.8     05-04          Radiologic Studies:

## 2019-05-04 NOTE — PROGRESS NOTE ADULT - SUBJECTIVE AND OBJECTIVE BOX
pt c/o abd pain  incision and prevena intact  JAVED serosang  continue current managment  ambulate today

## 2019-05-04 NOTE — PROGRESS NOTE ADULT - ASSESSMENT
49 year old woman s/p repair of recurrent incisional hernia with limited mobility due to significant incisional and suprapubic pain.  No GI function yet.  To increase dose of narcotic and combine withy NSAIDs for better pain control.  Meanwhile, we will continue with a liquid diet until te nausea subsides and she starts passing flatus.

## 2019-05-05 LAB
ANION GAP SERPL CALC-SCNC: 13 MMO/L — SIGNIFICANT CHANGE UP (ref 7–14)
BUN SERPL-MCNC: 4 MG/DL — LOW (ref 7–23)
CALCIUM SERPL-MCNC: 9.2 MG/DL — SIGNIFICANT CHANGE UP (ref 8.4–10.5)
CHLORIDE SERPL-SCNC: 100 MMOL/L — SIGNIFICANT CHANGE UP (ref 98–107)
CO2 SERPL-SCNC: 23 MMOL/L — SIGNIFICANT CHANGE UP (ref 22–31)
CREAT SERPL-MCNC: 0.8 MG/DL — SIGNIFICANT CHANGE UP (ref 0.5–1.3)
GLUCOSE SERPL-MCNC: 124 MG/DL — HIGH (ref 70–99)
HCT VFR BLD CALC: 32.5 % — LOW (ref 34.5–45)
HGB BLD-MCNC: 10.4 G/DL — LOW (ref 11.5–15.5)
MAGNESIUM SERPL-MCNC: 1.9 MG/DL — SIGNIFICANT CHANGE UP (ref 1.6–2.6)
MCHC RBC-ENTMCNC: 26.1 PG — LOW (ref 27–34)
MCHC RBC-ENTMCNC: 32 % — SIGNIFICANT CHANGE UP (ref 32–36)
MCV RBC AUTO: 81.7 FL — SIGNIFICANT CHANGE UP (ref 80–100)
NRBC # FLD: 0 K/UL — SIGNIFICANT CHANGE UP (ref 0–0)
PHOSPHATE SERPL-MCNC: 2.9 MG/DL — SIGNIFICANT CHANGE UP (ref 2.5–4.5)
PLATELET # BLD AUTO: 206 K/UL — SIGNIFICANT CHANGE UP (ref 150–400)
PMV BLD: 11.5 FL — SIGNIFICANT CHANGE UP (ref 7–13)
POTASSIUM SERPL-MCNC: 3.6 MMOL/L — SIGNIFICANT CHANGE UP (ref 3.5–5.3)
POTASSIUM SERPL-SCNC: 3.6 MMOL/L — SIGNIFICANT CHANGE UP (ref 3.5–5.3)
RBC # BLD: 3.98 M/UL — SIGNIFICANT CHANGE UP (ref 3.8–5.2)
RBC # FLD: 14 % — SIGNIFICANT CHANGE UP (ref 10.3–14.5)
SODIUM SERPL-SCNC: 136 MMOL/L — SIGNIFICANT CHANGE UP (ref 135–145)
WBC # BLD: 8.92 K/UL — SIGNIFICANT CHANGE UP (ref 3.8–10.5)
WBC # FLD AUTO: 8.92 K/UL — SIGNIFICANT CHANGE UP (ref 3.8–10.5)

## 2019-05-05 PROCEDURE — 93010 ELECTROCARDIOGRAM REPORT: CPT

## 2019-05-05 RX ORDER — IBUPROFEN 200 MG
400 TABLET ORAL EVERY 6 HOURS
Qty: 0 | Refills: 0 | Status: DISCONTINUED | OUTPATIENT
Start: 2019-05-05 | End: 2019-05-07

## 2019-05-05 RX ORDER — POTASSIUM CHLORIDE 20 MEQ
20 PACKET (EA) ORAL ONCE
Qty: 0 | Refills: 0 | Status: COMPLETED | OUTPATIENT
Start: 2019-05-05 | End: 2019-05-05

## 2019-05-05 RX ORDER — GABAPENTIN 400 MG/1
100 CAPSULE ORAL ONCE
Qty: 0 | Refills: 0 | Status: COMPLETED | OUTPATIENT
Start: 2019-05-05 | End: 2019-05-05

## 2019-05-05 RX ORDER — ACETAMINOPHEN 500 MG
650 TABLET ORAL EVERY 6 HOURS
Qty: 0 | Refills: 0 | Status: DISCONTINUED | OUTPATIENT
Start: 2019-05-05 | End: 2019-05-07

## 2019-05-05 RX ADMIN — ENOXAPARIN SODIUM 40 MILLIGRAM(S): 100 INJECTION SUBCUTANEOUS at 21:03

## 2019-05-05 RX ADMIN — GABAPENTIN 100 MILLIGRAM(S): 400 CAPSULE ORAL at 19:28

## 2019-05-05 RX ADMIN — HYDROMORPHONE HYDROCHLORIDE 2 MILLIGRAM(S): 2 INJECTION INTRAMUSCULAR; INTRAVENOUS; SUBCUTANEOUS at 05:25

## 2019-05-05 RX ADMIN — Medication 100 MILLIGRAM(S): at 05:12

## 2019-05-05 RX ADMIN — Medication 100 GRAM(S): at 05:11

## 2019-05-05 RX ADMIN — Medication 400 MILLIGRAM(S): at 21:32

## 2019-05-05 RX ADMIN — Medication 100 MILLIGRAM(S): at 17:05

## 2019-05-05 RX ADMIN — HYDROMORPHONE HYDROCHLORIDE 2 MILLIGRAM(S): 2 INJECTION INTRAMUSCULAR; INTRAVENOUS; SUBCUTANEOUS at 10:59

## 2019-05-05 RX ADMIN — HYDROMORPHONE HYDROCHLORIDE 2 MILLIGRAM(S): 2 INJECTION INTRAMUSCULAR; INTRAVENOUS; SUBCUTANEOUS at 23:08

## 2019-05-05 RX ADMIN — SENNA PLUS 2 TABLET(S): 8.6 TABLET ORAL at 21:03

## 2019-05-05 RX ADMIN — Medication 400 MILLIGRAM(S): at 21:02

## 2019-05-05 RX ADMIN — Medication 20 MILLIEQUIVALENT(S): at 13:25

## 2019-05-05 RX ADMIN — HYDROMORPHONE HYDROCHLORIDE 2 MILLIGRAM(S): 2 INJECTION INTRAMUSCULAR; INTRAVENOUS; SUBCUTANEOUS at 05:11

## 2019-05-05 RX ADMIN — HYDROMORPHONE HYDROCHLORIDE 2 MILLIGRAM(S): 2 INJECTION INTRAMUSCULAR; INTRAVENOUS; SUBCUTANEOUS at 10:29

## 2019-05-05 RX ADMIN — Medication 100 GRAM(S): at 16:29

## 2019-05-05 RX ADMIN — HYDROMORPHONE HYDROCHLORIDE 2 MILLIGRAM(S): 2 INJECTION INTRAMUSCULAR; INTRAVENOUS; SUBCUTANEOUS at 23:23

## 2019-05-05 NOTE — PROGRESS NOTE ADULT - ASSESSMENT
Assessment:49y Female w/ hx of hernia repair 4/18 now s/p revision repair of incisional hernia using mesh and reconstruction of abdominal wall using flap (5/2)    Plan:  - Pain control   - CLD for now  - F/u drain output  - Antibiotics until discharge  - Monitor abdominal exam  - OOB as tolerated  - PT - restorative rehab, f/u subsequent recommendation    A Team Surgery, [id number]

## 2019-05-05 NOTE — PROGRESS NOTE ADULT - SUBJECTIVE AND OBJECTIVE BOX
swallowing liquids, no vomit, but occasional belching  passed flatus once  abd closure intact with Prevena dressing in place  JPs serosang  cont current care  diet per Dr. Lazcano

## 2019-05-05 NOTE — PROGRESS NOTE ADULT - SUBJECTIVE AND OBJECTIVE BOX
S: pt seen and examined. no flatus; tolerating cld    O;  T(C): 37.6 (05-05-19 @ 05:09), Max: 37.6 (05-05-19 @ 05:09)  HR: 79 (05-05-19 @ 05:25) (79 - 96)  BP: 134/68 (05-05-19 @ 05:25) (120/70 - 145/67)  RR: 19 (05-05-19 @ 05:09) (13 - 20)  SpO2: 100% (05-05-19 @ 05:09) (96% - 100%)  Wt(kg): --  05-04 @ 07:01  -  05-05 @ 07:00  --------------------------------------------------------  IN:    IV PiggyBack: 100 mL    lactated ringers.: 400 mL    lactated ringers.: 120 mL    Oral Fluid: 2320 mL  Total IN: 2940 mL    OUT:    Bulb: 27.5 mL    Bulb: 55 mL    Bulb: 59.5 mL    Voided: 3350 mL  Total OUT: 3492 mL    Total NET: -552 mL      Gen: NAD  Chest comfortable on room air  Abd: incision c/d/i; transverse portion with provena holding suction; umbo viable    .  LABS:                         10.4   8.92  )-----------( 206      ( 05 May 2019 07:15 )             32.5     05-04    137  |  105  |  5<L>  ----------------------------<  122<H>  3.6   |  23  |  0.88    Ca    9.1      04 May 2019 06:05  Phos  1.8     05-04  Mg     1.8     05-04            Plan:  continue binder  continue provena  -continue drains  -care per primary

## 2019-05-05 NOTE — PROGRESS NOTE ADULT - SUBJECTIVE AND OBJECTIVE BOX
GENERAL SURGERY DAILY PROGRESS NOTE:     Subjective:  Pt seen and examined. No acute events overnight. Pt w/ pain issues yesterday and pain regimen changed to dilaudid and pain improved. However pt w/ episodes of nausea, no emesis. Tolerating clears and IVL. Pt w/ an episode of gas, denies bms.     Objective:    MEDICATIONS  (STANDING):  cefoTEtan  IVPB 2 Gram(s) IV Intermittent every 12 hours  docusate sodium 100 milliGRAM(s) Oral two times a day  enoxaparin Injectable 40 milliGRAM(s) SubCutaneous every 24 hours  senna 2 Tablet(s) Oral at bedtime    MEDICATIONS  (PRN):  HYDROmorphone  Injectable 1 milliGRAM(s) IV Push every 4 hours PRN Moderate Pain (4 - 6)  HYDROmorphone  Injectable 2 milliGRAM(s) IV Push every 4 hours PRN Severe Pain (7 - 10)  ondansetron Injectable 4 milliGRAM(s) IV Push once PRN Nausea and/or Vomiting      Vital Signs Last 24 Hrs  T(C): 37.6 (05 May 2019 05:09), Max: 37.6 (05 May 2019 05:09)  T(F): 99.6 (05 May 2019 05:09), Max: 99.6 (05 May 2019 05:09)  HR: 79 (05 May 2019 05:25) (79 - 96)  BP: 134/68 (05 May 2019 05:25) (120/70 - 145/67)  BP(mean): --  RR: 19 (05 May 2019 05:09) (13 - 20)  SpO2: 100% (05 May 2019 05:09) (96% - 100%)    I&O's Detail    04 May 2019 07:01  -  05 May 2019 07:00  --------------------------------------------------------  IN:    IV PiggyBack: 100 mL    lactated ringers.: 400 mL    lactated ringers.: 120 mL    Oral Fluid: 2320 mL  Total IN: 2940 mL    OUT:    Bulb: 27.5 mL    Bulb: 55 mL    Bulb: 59.5 mL    Voided: 3350 mL  Total OUT: 3492 mL    Total NET: -552 mL          Daily     Daily     LABS:                        10.4   8.92  )-----------( 206      ( 05 May 2019 07:15 )             32.5     05-05    136  |  100  |  4<L>  ----------------------------<  124<H>  3.6   |  23  |  0.80    Ca    9.2      05 May 2019 07:15  Phos  2.9     05-05  Mg     1.9     05-05            RADIOLOGY & ADDITIONAL STUDIES:    PHYSICAL EXAM  General: NAD, resting comfortably in bed  Pulmonary: Nonlabored breathing, no respiratory distress  Abdominal: soft, ND, appropriately tender to palpation, no RT, no guarding, prevena vac in place, 2 flank drains on each side, 1 drain on L side (medial) all with SS output, abd binder in place, no rebound/guarding

## 2019-05-05 NOTE — PROGRESS NOTE ADULT - ASSESSMENT
49 year old woman, on third post-operative day still with issues of pain control, but able to ambulate with assistance this am.  Awaiting GI function to advance diet, and eventually discharge the patient.

## 2019-05-05 NOTE — PROGRESS NOTE ADULT - SUBJECTIVE AND OBJECTIVE BOX
S/P repair of incisional hernia with abdominal wall component separation    POD # 3        Subjective: The pain is better controlled but the higher dose of narcotics makes me dizzy. I pass flatus only once.    Objective:   Vital Signs Last 24 Hrs  T(C): 37.2 (05 May 2019 10:00), Max: 37.6 (05 May 2019 05:09)  T(F): 98.9 (05 May 2019 10:00), Max: 99.6 (05 May 2019 05:09)  HR: 89 (05 May 2019 10:00) (79 - 96)  BP: 152/92 (05 May 2019 10:00) (120/70 - 152/92)  BP(mean): --  RR: 18 (05 May 2019 10:00) (13 - 20)  SpO2: 96% (05 May 2019 10:00) (96% - 100%)    Daily     Daily     Heent: N/C, AT PER no scleral icterus, No JVD  Pul: clear  Cor: RRR  Abdomen: soft, normal BS. Wound - clean  Extremities: without edema, motor/sensory intact    I&O's Detail    04 May 2019 07:01  -  05 May 2019 07:00  --------------------------------------------------------  IN:    IV PiggyBack: 100 mL    lactated ringers.: 400 mL    lactated ringers.: 120 mL    Oral Fluid: 2320 mL  Total IN: 2940 mL    OUT:    Bulb: 27.5 mL    Bulb: 55 mL    Bulb: 59.5 mL    Voided: 3350 mL  Total OUT: 3492 mL    Total NET: -552 mL      05 May 2019 07:01  -  05 May 2019 11:41  --------------------------------------------------------  IN:    Oral Fluid: 480 mL  Total IN: 480 mL    OUT:    Bulb: 10 mL    Bulb: 10 mL    Bulb: 10 mL    Voided: 400 mL  Total OUT: 430 mL    Total NET: 50 mL          MEDICATIONS  (STANDING):  cefoTEtan  IVPB 2 Gram(s) IV Intermittent every 12 hours  docusate sodium 100 milliGRAM(s) Oral two times a day  enoxaparin Injectable 40 milliGRAM(s) SubCutaneous every 24 hours  potassium chloride    Tablet ER 20 milliEquivalent(s) Oral once  senna 2 Tablet(s) Oral at bedtime    MEDICATIONS  (PRN):  acetaminophen   Tablet .. 650 milliGRAM(s) Oral every 6 hours PRN Mild Pain (1 - 3)  HYDROmorphone  Injectable 1 milliGRAM(s) IV Push every 4 hours PRN Moderate Pain (4 - 6)  HYDROmorphone  Injectable 2 milliGRAM(s) IV Push every 4 hours PRN Severe Pain (7 - 10)  ibuprofen  Tablet. 400 milliGRAM(s) Oral every 6 hours PRN Mild Pain (1 - 3)  ondansetron Injectable 4 milliGRAM(s) IV Push once PRN Nausea and/or Vomiting                            10.4   8.92  )-----------( 206      ( 05 May 2019 07:15 )             32.5     05-05    136  |  100  |  4<L>  ----------------------------<  124<H>  3.6   |  23  |  0.80    Ca    9.2      05 May 2019 07:15  Phos  2.9     05-05  Mg     1.9     05-05          Radiologic Studies:

## 2019-05-06 ENCOUNTER — TRANSCRIPTION ENCOUNTER (OUTPATIENT)
Age: 49
End: 2019-05-06

## 2019-05-06 LAB
ANION GAP SERPL CALC-SCNC: 9 MMO/L — SIGNIFICANT CHANGE UP (ref 7–14)
BUN SERPL-MCNC: 5 MG/DL — LOW (ref 7–23)
CALCIUM SERPL-MCNC: 9.5 MG/DL — SIGNIFICANT CHANGE UP (ref 8.4–10.5)
CHLORIDE SERPL-SCNC: 105 MMOL/L — SIGNIFICANT CHANGE UP (ref 98–107)
CO2 SERPL-SCNC: 25 MMOL/L — SIGNIFICANT CHANGE UP (ref 22–31)
CREAT SERPL-MCNC: 0.81 MG/DL — SIGNIFICANT CHANGE UP (ref 0.5–1.3)
GLUCOSE SERPL-MCNC: 106 MG/DL — HIGH (ref 70–99)
HCT VFR BLD CALC: 31 % — LOW (ref 34.5–45)
HGB BLD-MCNC: 10 G/DL — LOW (ref 11.5–15.5)
MAGNESIUM SERPL-MCNC: 2 MG/DL — SIGNIFICANT CHANGE UP (ref 1.6–2.6)
MCHC RBC-ENTMCNC: 26.1 PG — LOW (ref 27–34)
MCHC RBC-ENTMCNC: 32.3 % — SIGNIFICANT CHANGE UP (ref 32–36)
MCV RBC AUTO: 80.9 FL — SIGNIFICANT CHANGE UP (ref 80–100)
NRBC # FLD: 0 K/UL — SIGNIFICANT CHANGE UP (ref 0–0)
PHOSPHATE SERPL-MCNC: 3.2 MG/DL — SIGNIFICANT CHANGE UP (ref 2.5–4.5)
PLATELET # BLD AUTO: 223 K/UL — SIGNIFICANT CHANGE UP (ref 150–400)
PMV BLD: 11.4 FL — SIGNIFICANT CHANGE UP (ref 7–13)
POTASSIUM SERPL-MCNC: 3.8 MMOL/L — SIGNIFICANT CHANGE UP (ref 3.5–5.3)
POTASSIUM SERPL-SCNC: 3.8 MMOL/L — SIGNIFICANT CHANGE UP (ref 3.5–5.3)
RBC # BLD: 3.83 M/UL — SIGNIFICANT CHANGE UP (ref 3.8–5.2)
RBC # FLD: 14.1 % — SIGNIFICANT CHANGE UP (ref 10.3–14.5)
SODIUM SERPL-SCNC: 139 MMOL/L — SIGNIFICANT CHANGE UP (ref 135–145)
WBC # BLD: 5.41 K/UL — SIGNIFICANT CHANGE UP (ref 3.8–10.5)
WBC # FLD AUTO: 5.41 K/UL — SIGNIFICANT CHANGE UP (ref 3.8–10.5)

## 2019-05-06 RX ORDER — SODIUM CHLORIDE 9 MG/ML
3 INJECTION INTRAMUSCULAR; INTRAVENOUS; SUBCUTANEOUS EVERY 8 HOURS
Qty: 0 | Refills: 0 | Status: DISCONTINUED | OUTPATIENT
Start: 2019-05-06 | End: 2019-05-07

## 2019-05-06 RX ORDER — POTASSIUM CHLORIDE 20 MEQ
20 PACKET (EA) ORAL ONCE
Qty: 0 | Refills: 0 | Status: COMPLETED | OUTPATIENT
Start: 2019-05-06 | End: 2019-05-06

## 2019-05-06 RX ORDER — HYDROMORPHONE HYDROCHLORIDE 2 MG/ML
1 INJECTION INTRAMUSCULAR; INTRAVENOUS; SUBCUTANEOUS EVERY 6 HOURS
Qty: 0 | Refills: 0 | Status: DISCONTINUED | OUTPATIENT
Start: 2019-05-06 | End: 2019-05-07

## 2019-05-06 RX ORDER — OXYCODONE HYDROCHLORIDE 5 MG/1
10 TABLET ORAL EVERY 6 HOURS
Qty: 0 | Refills: 0 | Status: DISCONTINUED | OUTPATIENT
Start: 2019-05-06 | End: 2019-05-07

## 2019-05-06 RX ORDER — OXYCODONE HYDROCHLORIDE 5 MG/1
5 TABLET ORAL EVERY 6 HOURS
Qty: 0 | Refills: 0 | Status: DISCONTINUED | OUTPATIENT
Start: 2019-05-06 | End: 2019-05-07

## 2019-05-06 RX ADMIN — Medication 100 MILLIGRAM(S): at 18:57

## 2019-05-06 RX ADMIN — ENOXAPARIN SODIUM 40 MILLIGRAM(S): 100 INJECTION SUBCUTANEOUS at 21:02

## 2019-05-06 RX ADMIN — Medication 400 MILLIGRAM(S): at 06:26

## 2019-05-06 RX ADMIN — SENNA PLUS 2 TABLET(S): 8.6 TABLET ORAL at 21:02

## 2019-05-06 RX ADMIN — OXYCODONE HYDROCHLORIDE 5 MILLIGRAM(S): 5 TABLET ORAL at 23:10

## 2019-05-06 RX ADMIN — Medication 400 MILLIGRAM(S): at 07:06

## 2019-05-06 RX ADMIN — OXYCODONE HYDROCHLORIDE 5 MILLIGRAM(S): 5 TABLET ORAL at 12:50

## 2019-05-06 RX ADMIN — OXYCODONE HYDROCHLORIDE 5 MILLIGRAM(S): 5 TABLET ORAL at 13:30

## 2019-05-06 RX ADMIN — Medication 400 MILLIGRAM(S): at 22:09

## 2019-05-06 RX ADMIN — Medication 400 MILLIGRAM(S): at 21:02

## 2019-05-06 RX ADMIN — Medication 100 MILLIGRAM(S): at 06:26

## 2019-05-06 RX ADMIN — Medication 20 MILLIEQUIVALENT(S): at 12:50

## 2019-05-06 RX ADMIN — SODIUM CHLORIDE 3 MILLILITER(S): 9 INJECTION INTRAMUSCULAR; INTRAVENOUS; SUBCUTANEOUS at 21:03

## 2019-05-06 RX ADMIN — Medication 100 GRAM(S): at 16:43

## 2019-05-06 RX ADMIN — Medication 100 GRAM(S): at 04:28

## 2019-05-06 NOTE — DISCHARGE NOTE PROVIDER - NSDCCPTREATMENT_GEN_ALL_CORE_FT
PRINCIPAL PROCEDURE  Procedure: Incisional hernia repair with mesh  Findings and Treatment:       SECONDARY PROCEDURE  Procedure: Reconstruction of abdominal wall using flap  Findings and Treatment:

## 2019-05-06 NOTE — PROGRESS NOTE ADULT - SUBJECTIVE AND OBJECTIVE BOX
patient known to me from the community,   patient is s/p abdominal surgery for ventral hernia (recurrent)   c/o pain to surgical incision site.  no bm thus far.  + flatulance    O/E:  Vital Signs Last 24 Hrs  T(C): 37.1 (06 May 2019 17:57), Max: 37.3 (05 May 2019 21:04)  T(F): 98.7 (06 May 2019 17:57), Max: 99.2 (05 May 2019 21:04)  HR: 84 (06 May 2019 17:57) (77 - 90)  BP: 137/81 (06 May 2019 17:57) (122/74 - 149/90)  BP(mean): --  RR: 17 (06 May 2019 17:57) (16 - 18)  SpO2: 99% (06 May 2019 17:57) (97% - 100%)    no jvd,  no icterus  neck supple.  cvs: s1s2 regular no gallop  rs: bilateral air entry no rales   pa: dressing, with abdominal guard,  + JAVED drains  ext: no edema  no calf tenderness    labs with Hg 10gm; bmp acceptable   incentive lizzie up to 1500      A/P:  s/p abdominal surgery  Anemia  Obesity  continue plan as per surgical team with pain medication and IV abx.  ambulation as tolerated.  will monitor bp mild elevation with no history of HTN.     will discuss with surgical team.   [facility] for GI and DVT prophylaxis.

## 2019-05-06 NOTE — PROGRESS NOTE ADULT - ASSESSMENT
A/P 49F s/p WILDA, component separation, ariadne de spenser panniculectomy POD4  - Continue with prevena vac  - Continue with drains (output > 30)   - Diet per primary  - Pain control  - Continue Abx  - Encourage ambulation  - VTE ppx  Myriam Marti PGY3

## 2019-05-06 NOTE — PROGRESS NOTE ADULT - SUBJECTIVE AND OBJECTIVE BOX
Plastic Surgery    SUBJECTIVE:   + flatus, - BM, pain slightly improved.  Patient reports ambulating during the day.  Advanced to regular diet last night per primary    VITALS  T(C): 37.1 (05-06-19 @ 06:20), Max: 37.3 (05-05-19 @ 13:17)  HR: 82 (05-06-19 @ 06:20) (80 - 92)  BP: 132/89 (05-06-19 @ 06:20) (122/74 - 149/90)  RR: 17 (05-06-19 @ 06:20) (14 - 17)  SpO2: 99% (05-06-19 @ 06:20) (96% - 100%)  CAPILLARY BLOOD GLUCOSE          Is/Os    05-05 @ 07:01  -  05-06 @ 07:00  --------------------------------------------------------  IN:    IV PiggyBack: 100 mL    Oral Fluid: 2030 mL  Total IN: 2130 mL    OUT:    Bulb: 57 mL    Bulb: 65 mL    Bulb: 47.5 mL    Voided: 2300 mL  Total OUT: 2469.5 mL    Total NET: -339.5 mL      05-06 @ 07:01  -  05-06 @ 10:21  --------------------------------------------------------  IN:  Total IN: 0 mL    OUT:    Bulb: 39 mL    Bulb: 35 mL    Bulb: 25 mL    Voided: 450 mL  Total OUT: 549 mL    Total NET: -549 mL          PHYSICAL EXAM:   General: NAD, Lying in bed comfortably  Abd: Incision beyond vac sponge is intact, abdomen softly distended, appropriately tender to palpation.  Drains SS    MEDICATIONS (STANDING): cefoTEtan  IVPB 2 Gram(s) IV Intermittent every 12 hours  docusate sodium 100 milliGRAM(s) Oral two times a day  enoxaparin Injectable 40 milliGRAM(s) SubCutaneous every 24 hours  potassium chloride    Tablet ER 20 milliEquivalent(s) Oral once  senna 2 Tablet(s) Oral at bedtime    MEDICATIONS (PRN):acetaminophen   Tablet .. 650 milliGRAM(s) Oral every 6 hours PRN Mild Pain (1 - 3)  HYDROmorphone  Injectable 1 milliGRAM(s) IV Push every 6 hours PRN Severe Pain (7 - 10)  ibuprofen  Tablet. 400 milliGRAM(s) Oral every 6 hours PRN Mild Pain (1 - 3)  ondansetron Injectable 4 milliGRAM(s) IV Push once PRN Nausea and/or Vomiting  oxyCODONE    IR 5 milliGRAM(s) Oral every 6 hours PRN Moderate Pain (4 - 6)  oxyCODONE    IR 10 milliGRAM(s) Oral every 6 hours PRN Severe Pain (7 - 10)      LABS  CBC (05-06 @ 06:05)                              10.0<L>                         5.41    )----------------(  223        --    % Neutrophils, --    % Lymphocytes, ANC: --                                  31.0<L>  CBC (05-05 @ 07:15)                              10.4<L>                         8.92    )----------------(  206        --    % Neutrophils, --    % Lymphocytes, ANC: --                                  32.5<L>    BMP (05-06 @ 06:05)             139     |  105     |  5<L>  		Ca++ --      Ca 9.5                ---------------------------------( 106<H>		Mg 2.0                3.8     |  25      |  0.81  			Ph 3.2     BMP (05-05 @ 07:15)             136     |  100     |  4<L>  		Ca++ --      Ca 9.2                ---------------------------------( 124<H>		Mg 1.9                3.6     |  23      |  0.80  			Ph 2.9                   IMAGING STUDIES

## 2019-05-06 NOTE — DISCHARGE NOTE PROVIDER - PROVIDER TOKENS
PROVIDER:[TOKEN:[[id number]],FOLLOWUP:[1 week]] PROVIDER:[TOKEN:[[id number]],FOLLOWUP:[1 week]],PROVIDER:[TOKEN:[[id number]],FOLLOWUP:[1 week]] PROVIDER:[TOKEN:[[id number]],FOLLOWUP:[2 weeks]],PROVIDER:[TOKEN:[[id number]]]

## 2019-05-06 NOTE — DISCHARGE NOTE PROVIDER - NSDCCPCAREPLAN_GEN_ALL_CORE_FT
PRINCIPAL DISCHARGE DIAGNOSIS  Diagnosis: Recurrent incisional hernia  Assessment and Plan of Treatment: PRINCIPAL DISCHARGE DIAGNOSIS  Diagnosis: Recurrent incisional hernia  Assessment and Plan of Treatment: WOUND CARE:  Please keep incisions clean and dry. Please do not Scrub or rub incisions. Do not use lotion or powder on incisions.   BATHING: Please do not submerge wound underwater. You may shower and/or sponge bathe.  ACTIVITY: No heavy lifting or straining. Otherwise, you may return to your usual level of physical activity. If you are taking narcotic pain medication (such as Percocet or Oxycodone) DO NOT drive a car, operate machinery or make important decisions.  DIET: Return to your usual diet.  NOTIFY YOUR SURGEON IF: You have any bleeding that does not stop, any pus draining from your wound(s), any fever (over 100.4 F) or chills, persistent nausea/vomiting, persistent diarrhea, or if your pain is not controlled on your discharge pain medications.  FOLLOW-UP: Please follow up with your primary care physician in one week regarding your hospitalization. Please follow-up with your surgeon, Dr. Lazcano within 7 days following discharge - please call [phone]  to schedule an appointment. PRINCIPAL DISCHARGE DIAGNOSIS  Diagnosis: Recurrent incisional hernia  Assessment and Plan of Treatment: WOUND CARE:  Please keep incisions clean and dry. Please do not Scrub or rub incisions. Do not use lotion or powder on incisions.   BATHING: Please do not submerge wound underwater. You may shower and/or sponge bathe.  ACTIVITY: No heavy lifting or straining. Otherwise, you may return to your usual level of physical activity. If you are taking narcotic pain medication (such as Percocet or Oxycodone) DO NOT drive a car, operate machinery or make important decisions.  DIET: Return to your usual diet.  NOTIFY YOUR SURGEON IF: You have any bleeding that does not stop, any pus draining from your wound(s), any fever (over 100.4 F) or chills, persistent nausea/vomiting, persistent diarrhea, or if your pain is not controlled on your discharge pain medications.  FOLLOW-UP: Please follow up with your primary care physician in one week regarding your hospitalization. Please follow-up with your surgeon, Dr. Lazcano within 7 days following discharge - please call [phone]  to schedule an appointment.  Please follow-up with your surgeon, Dr. Martin within 7 days following discharge - please call [phone] to schedule an appointment. PRINCIPAL DISCHARGE DIAGNOSIS  Diagnosis: Recurrent incisional hernia  Assessment and Plan of Treatment: WOUND CARE:  Please keep incisions clean and dry. Please do not Scrub or rub incisions. Do not use lotion or powder on incisions.   BATHING: Please do not submerge wound underwater. You may shower and/or sponge bathe.  ACTIVITY: No heavy lifting or straining. Otherwise, you may return to your usual level of physical activity. If you are taking narcotic pain medication (such as Percocet or Oxycodone) DO NOT drive a car, operate machinery or make important decisions.  DIET: Return to your usual diet.  NOTIFY YOUR SURGEON IF: You have any bleeding that does not stop, any pus draining from your wound(s), any fever (over 100.4 F) or chills, persistent nausea/vomiting, persistent diarrhea, or if your pain is not controlled on your discharge pain medications.  FOLLOW-UP: Please follow up with your primary care physician in one week regarding your hospitalization. Please follow-up with your surgeon, Dr. Lazcano within 7 days following discharge - please call [phone]  to schedule an appointment.  Please follow-up with your surgeon, Dr. Martin within 7 days following discharge - please call [phone] to schedule an appointment.  ***PLEASE FOLLOW UP WITH YOUR SURGEONS FOR JAVED DRAIN REMOVAL***  You will be discharged with JAVED drains.  You will need to empty them and record outputs accurately.  This has been taught to you by the nursing staff.  Please do not remove the JAVED drains.  They will be removed in the office.  Please bring to the office accurate records of output. PRINCIPAL DISCHARGE DIAGNOSIS  Diagnosis: Recurrent incisional hernia  Assessment and Plan of Treatment: WOUND CARE:  Please keep incisions clean and dry. Please do not Scrub or rub incisions. Do not use lotion or powder on incisions.   BATHING: Please do not submerge wound underwater. You may shower and/or sponge bathe.  ACTIVITY: No heavy lifting or straining. Otherwise, you may return to your usual level of physical activity. If you are taking narcotic pain medication (such as Percocet or Oxycodone) DO NOT drive a car, operate machinery or make important decisions.  DIET: Return to your usual diet.  NOTIFY YOUR SURGEON IF: You have any bleeding that does not stop, any pus draining from your wound(s), any fever (over 100.4 F) or chills, persistent nausea/vomiting, persistent diarrhea, or if your pain is not controlled on your discharge pain medications.  FOLLOW-UP: Please follow up with your primary care physician in one week regarding your hospitalization. Please follow-up with your surgeon, Dr. Lazcano within 7 days following discharge - please call [phone]  to schedule an appointment.  Please follow-up with your surgeon, Dr. Martin within 7 days following discharge - please call [phone] to schedule an appointment.  ***PLEASE FOLLOW UP WITH YOUR SURGEONS FOR JAVED DRAIN AND VAC REMOVAL***  You will be discharged with JAVED drains.  You will need to empty them and record outputs accurately.  This has been taught to you by the nursing staff.  Please do not remove the JAVED drains.  They will be removed in the office.  Please bring to the office accurate records of output.  Please keep Prevena vac in place - do not remove yourself.  Follow up in office with Dr. Lazcano for removal. PRINCIPAL DISCHARGE DIAGNOSIS  Diagnosis: Recurrent incisional hernia  Assessment and Plan of Treatment: WOUND CARE:  Please keep incisions clean and dry. Please do not Scrub or rub incisions. Do not use lotion or powder on incisions.   BATHING: Please do not submerge wound underwater. You may shower and/or sponge bathe.  ACTIVITY: No heavy lifting or straining. Otherwise, you may return to your usual level of physical activity. If you are taking narcotic pain medication (such as Percocet or Oxycodone) DO NOT drive a car, operate machinery or make important decisions.  DIET: Return to your usual diet.  NOTIFY YOUR SURGEON IF: You have any bleeding that does not stop, any pus draining from your wound(s), any fever (over 100.4 F) or chills, persistent nausea/vomiting, persistent diarrhea, or if your pain is not controlled on your discharge pain medications.  FOLLOW-UP: Please follow up with your primary care physician in one week regarding your hospitalization. Please follow-up with your surgeon, Dr. Lazcano within 14 days following discharge - please call [phone]  to schedule an appointment.  Please follow-up with your plastic surgeon, Dr. Martin within 6 days following discharge (5/13/19) - please call [phone] to schedule an appointment.  ***PLEASE FOLLOW UP WITH YOUR SURGEONS FOR JAVED DRAIN AND VAC REMOVAL***  You will be discharged with JAVED drains.  You will need to empty them and record outputs accurately.  This has been taught to you by the nursing staff.  Please do not remove the JVAED drains.  They will be removed in the office.  Please bring to the office accurate records of output.  Please keep Prevena vac in place - do not remove yourself.  Follow up in office with Dr. Lazcano for removal. PRINCIPAL DISCHARGE DIAGNOSIS  Diagnosis: Recurrent incisional hernia  Assessment and Plan of Treatment: WOUND CARE:  Please keep incisions clean and dry. Please do not Scrub or rub incisions. Do not use lotion or powder on incisions.   BATHING: Please do not submerge wound underwater. You may shower and/or sponge bathe.  ACTIVITY: No heavy lifting or straining. Otherwise, you may return to your usual level of physical activity. If you are taking narcotic pain medication (such as Percocet or Oxycodone) DO NOT drive a car, operate machinery or make important decisions.  DIET: Return to your usual diet.  NOTIFY YOUR SURGEON IF: You have any bleeding that does not stop, any pus draining from your wound(s), any fever (over 100.4 F) or chills, persistent nausea/vomiting, persistent diarrhea, or if your pain is not controlled on your discharge pain medications.  FOLLOW-UP: Please follow up with your primary care physician in one week regarding your hospitalization. Please follow-up with your surgeon, Dr. Lazcano within 14 days following discharge - please call [phone]  to schedule an appointment.  Please follow-up with your plastic surgeon, Dr. Martin within 6 days following discharge (5/13/19) - please call [phone] to schedule an appointment.  ***PLEASE FOLLOW UP WITH YOUR SURGEONS FOR JAVED DRAIN AND VAC REMOVAL***  You will be discharged with JAVED drains.  You will need to empty them and record outputs accurately.  This has been taught to you by the nursing staff.  Please do not remove the JAVED drains.  They will be removed in the office.  Please bring to the office accurate records of output.  Please keep Prevena vac in place - do not remove yourself.  Follow up in office with Dr. Martin on Monday, May 13, 2019 for removal. PRINCIPAL DISCHARGE DIAGNOSIS  Diagnosis: Recurrent incisional hernia  Assessment and Plan of Treatment: WOUND CARE:  Please keep incisions clean and dry. Please do not Scrub or rub incisions. Do not use lotion or powder on incisions.   BATHING: Please do not submerge wound underwater. You may shower and/or sponge bathe.  ACTIVITY: No heavy lifting or straining. Otherwise, you may return to your usual level of physical activity. If you are taking narcotic pain medication (such as Percocet or Oxycodone) DO NOT drive a car, operate machinery or make important decisions.  DIET: Return to your usual diet.  NOTIFY YOUR SURGEON IF: You have any bleeding that does not stop, any pus draining from your wound(s), any fever (over 100.4 F) or chills, persistent nausea/vomiting, persistent diarrhea, or if your pain is not controlled on your discharge pain medications.  FOLLOW-UP: Please follow up with your primary care physician in one week regarding your hospitalization. Please follow-up with your surgeon, Dr. Lazcano within 14 days following discharge - please call [phone]  to schedule an appointment.  Please follow-up with your plastic surgeon, Dr. Martin within 6 days following discharge (5/13/19) - please call [phone] to schedule an appointment.  ***PLEASE FOLLOW UP WITH YOUR SURGEONS FOR JAVED DRAIN AND VAC REMOVAL***  You will be discharged with JAVED drains.  You will need to empty them and record outputs accurately.  This has been taught to you by the nursing staff.  Please do not remove the JAVED drains.  They will be removed in the office.  Please bring to the office accurate records of output.  Please keep Prevena vac in place - do not remove yourself.  Follow up in office with Dr. Martin on Monday, May 13, 2019 for removal.

## 2019-05-06 NOTE — PROGRESS NOTE ADULT - SUBJECTIVE AND OBJECTIVE BOX
Surgery Progress Note     Patient is a 49y old  Female who presents with a chief complaint of Recurrent incisional hernia (05 May 2019 11:39)      HPI:      PAST MEDICAL & SURGICAL HISTORY:  Fibroid uterus  Incisional hernia  Obesity  H/O:   H/O myomectomy: 2016  H/O hernia repair: 2018 - with mesh      Physical Exam:    Vital Signs Last 24 Hrs  T(C): 37 (06 May 2019 13:45), Max: 37.3 (05 May 2019 21:04)  T(F): 98.6 (06 May 2019 13:45), Max: 99.2 (05 May 2019 21:04)  HR: 77 (06 May 2019 13:45) (77 - 90)  BP: 126/71 (06 May 2019 13:45) (122/74 - 149/90)  BP(mean): --  RR: 17 (06 May 2019 13:45) (16 - 18)  SpO2: 99% (06 May 2019 13:45) (97% - 100%)    General appearance:   appears comfortable' C / o pain  Abd. soft. wound dry.        Drainage    Labs:                          10.0   5.41  )-----------( 223      ( 06 May 2019 06:05 )             31.0     05-06    139  |  105  |  5<L>  ----------------------------<  106<H>  3.8   |  25  |  0.81    Ca    9.5      06 May 2019 06:05  Phos  3.2     05-06  Mg     2.0     05-06            Assessment and Plan:

## 2019-05-06 NOTE — DISCHARGE NOTE PROVIDER - CARE PROVIDER_API CALL
Ela Lazcano)  Surgery  [address], [address]  [place] [address]  Phone: [phone]  Fax: [phone]  Follow Up Time: 1 week Ela Lazcano)  Surgery  [address], [address]  [place] [address]  Phone: [phone]  Fax: [phone]  Follow Up Time: 1 week    Tommy Martin)  Plastic Surgery; Surgery  [address], [address]  [place] [address]  Phone: [phone]  Fax: [phone]  Follow Up Time: 1 week Ela Lazcano)  Surgery  [address] [facility] [address] [facility], [address]  [place] [address]  Phone: [phone]  Fax: [phone]  Follow Up Time: 2 weeks    Tommy Martin)  Plastic Surgery; Surgery  [address] [facility], [address]  [place] [address]  Phone: [phone]  Fax: [phone]  Follow Up Time:

## 2019-05-06 NOTE — DISCHARGE NOTE PROVIDER - HOSPITAL COURSE
Pt is a 49 yr old female who was scheduled for an Abdominal Wall Component Separation Repair wtih Mesh and flap with Dr Lazcano and Dr Martin for c/o of recurring incisional hernia . Pt had hernia repair 4/18 and noted pain that started 8/18 with bulging and increased pain 12/18. On 5/2, the patient underwent an incisional hernia repair, lysis of adhesions and b/l components separation with Dr. Lazcano. Patient simultaneously had an umbilicoplasty with bi-pedicaled flat, abdominal closure with ariadne de lis skin advancement flap with plastic surgery. On 5/4, patient's diet was advanced to clear liquids, her Coello catheter was removed, and she passed a trial of void. On 5/5, patient exhibited signs of bowel function and was advanced to a regular diet, which she tolerated well. Patient is a 49 year old female with a PMHx of uterine fibroids and history of hernia repair 4/2018 who presented with recurrent incisional hernia.          She is S/P Repair of incisional hernia using mesh, lysis of adhesions and bilateral components separation on 5/2/19 with Dr. Lazcano.  Patient simultaneously had an umbilicoplasty with bi-pedicaled flat, abdominal closure with ariadne de lis skin advancement flap with plastic surgery.          On 5/4, patient's diet was advanced to clear liquids.  Her baeza catheter was removed and she passed a trial of void.         On 5/5, patient exhibited signs of bowel function and was advanced to a regular diet, which she tolerated well.  Pain has been well controlled with oral pain regimen.        Per attending, patient deemed stable for discharge home. Patient is a 49 year old female with a PMHx of uterine fibroids and history of hernia repair 4/2018 who presented with recurrent incisional hernia.          She is S/P Repair of incisional hernia using mesh, lysis of adhesions and bilateral components separation on 5/2/19 with Dr. Lazcano.  Patient simultaneously had an umbilicoplasty with bi-pedicaled flat, abdominal closure with ariadne de lis skin advancement flap with plastic surgery.          On 5/4, patient's diet was advanced to clear liquids.  Her baeza catheter was removed and she passed a trial of void.         On 5/5, patient exhibited signs of bowel function and was advanced to a regular diet, which she tolerated well.  Patient still with intermittent pain - will be discharged with NSAIDs and Oxycodone PRN.        Per attending, patient deemed stable for discharge home. Patient is a 49 year old female with a PMHx of uterine fibroids and history of hernia repair 4/2018 who presented with recurrent incisional hernia.          She is S/P Repair of incisional hernia using mesh, lysis of adhesions and bilateral components separation on 5/2/19 with Dr. Lazcano.  Patient simultaneously had an umbilicoplasty with bi-pedicaled flat, abdominal closure with ariadne de lis skin advancement flap with plastic surgery.          On 5/4, patient's diet was advanced to clear liquids.  Her baeza catheter was removed and she passed a trial of void.         On 5/5, patient exhibited signs of bowel function and was advanced to a regular diet, which she tolerated well.  Patient still with intermittent pain - will be discharged with NSAIDs and Oxycodone PRN.        Per discussion with plastics team, patient to be discharged home with Prevena vac.  JAVED drain teaching provided in hospital by nursing staff.        Per attending, patient deemed stable for discharge home. Patient is a 49 year old female with a PMHx of uterine fibroids and history of hernia repair 4/2018 who presented with recurrent incisional hernia.          She is S/P Repair of incisional hernia using mesh, lysis of adhesions and bilateral components separation on 5/2/19 with Dr. Lazcano.  Patient simultaneously had an umbilicoplasty with bi-pedicaled flat, abdominal closure with ariadne de lis skin advancement flap with plastic surgery.          On 5/4, patient's diet was advanced to clear liquids.  Her baeza catheter was removed and she passed a trial of void.         On 5/5, patient exhibited signs of bowel function and was advanced to a regular diet, which she tolerated well.  Patient still with intermittent pain - will be discharged with NSAIDs and Oxycodone PRN.        Per discussion with plastics team, patient to be discharged home with Prevena vac and will be removed by Dr. Martin on Monday, May 13, 2019.  JAVED drain teaching provided in hospital by nursing staff.        Per attending, patient deemed stable for discharge home.

## 2019-05-06 NOTE — PROGRESS NOTE ADULT - SUBJECTIVE AND OBJECTIVE BOX
GENERAL SURGERY DAILY PROGRESS NOTE:     Subjective:    Pt seen and examined. No acute events overnight. Pain improved, however pt now complaining of burning pain sensation over vac site, saying that she feels it is inflamed and she wants antibiotics for this and her pain regimen switched. Pt informed she is on abx and pt encouraged to continue multimodal pain control therapy. Dose of gabapentin ordered as pt didn't think her current pain control regimen would help with burning pain. Pt tolerating diet now. Pt ambulating and voiding. Passing gas.       Objective:    MEDICATIONS  (STANDING):  cefoTEtan  IVPB 2 Gram(s) IV Intermittent every 12 hours  docusate sodium 100 milliGRAM(s) Oral two times a day  enoxaparin Injectable 40 milliGRAM(s) SubCutaneous every 24 hours  senna 2 Tablet(s) Oral at bedtime    MEDICATIONS  (PRN):  acetaminophen   Tablet .. 650 milliGRAM(s) Oral every 6 hours PRN Mild Pain (1 - 3)  HYDROmorphone  Injectable 1 milliGRAM(s) IV Push every 4 hours PRN Moderate Pain (4 - 6)  HYDROmorphone  Injectable 2 milliGRAM(s) IV Push every 4 hours PRN Severe Pain (7 - 10)  ibuprofen  Tablet. 400 milliGRAM(s) Oral every 6 hours PRN Mild Pain (1 - 3)  ondansetron Injectable 4 milliGRAM(s) IV Push once PRN Nausea and/or Vomiting      Vital Signs Last 24 Hrs  T(C): 37.3 (05 May 2019 21:04), Max: 37.6 (05 May 2019 05:09)  T(F): 99.2 (05 May 2019 21:04), Max: 99.6 (05 May 2019 05:09)  HR: 87 (05 May 2019 23:06) (79 - 92)  BP: 140/86 (05 May 2019 23:06) (120/70 - 152/92)  BP(mean): --  RR: 16 (05 May 2019 23:06) (14 - 20)  SpO2: 100% (05 May 2019 23:06) (96% - 100%)    I&O's Detail    04 May 2019 07:01  -  05 May 2019 07:00  --------------------------------------------------------  IN:    IV PiggyBack: 100 mL    lactated ringers.: 400 mL    lactated ringers.: 120 mL    Oral Fluid: 2320 mL  Total IN: 2940 mL    OUT:    Bulb: 27.5 mL    Bulb: 55 mL    Bulb: 59.5 mL    Voided: 3350 mL  Total OUT: 3492 mL    Total NET: -552 mL      05 May 2019 07:01  -  06 May 2019 00:39  --------------------------------------------------------  IN:    IV PiggyBack: 50 mL    Oral Fluid: 1830 mL  Total IN: 1880 mL    OUT:    Bulb: 57 mL    Bulb: 65 mL    Bulb: 47.5 mL    Voided: 2100 mL  Total OUT: 2269.5 mL    Total NET: -389.5 mL          Daily     Daily     LABS:                        10.4   8.92  )-----------( 206      ( 05 May 2019 07:15 )             32.5     05-05    136  |  100  |  4<L>  ----------------------------<  124<H>  3.6   |  23  |  0.80    Ca    9.2      05 May 2019 07:15  Phos  2.9     05-05  Mg     1.9     05-05            RADIOLOGY & ADDITIONAL STUDIES:    PHYSICAL EXAM  General: NAD, resting comfortably in bed  Pulmonary: Nonlabored breathing, no respiratory distress  Abdominal: soft, ND, appropriately tender to palpation, no RT, no guarding, prevena vac in place, 2 flank drains on each side, 1 drain on L side (medial) all with SS output, abd binder in place, no rebound/guarding

## 2019-05-06 NOTE — PROGRESS NOTE ADULT - ASSESSMENT
Assessment:49y Female w/ hx of hernia repair 4/18 now s/p revision repair of incisional hernia using mesh and reconstruction of abdominal wall using flap (5/2)    Plan:  - Pain control   - regular diet  - F/u drain output  - Antibiotics until discharge per plastics  - f/u plastics regarding dressing  - Monitor abdominal exam  - OOB as tolerated  - PT - TAWANNA CAO Team Surgery, [id number]

## 2019-05-07 ENCOUNTER — TRANSCRIPTION ENCOUNTER (OUTPATIENT)
Age: 49
End: 2019-05-07

## 2019-05-07 VITALS
OXYGEN SATURATION: 100 % | HEART RATE: 74 BPM | TEMPERATURE: 98 F | DIASTOLIC BLOOD PRESSURE: 88 MMHG | RESPIRATION RATE: 18 BRPM | SYSTOLIC BLOOD PRESSURE: 132 MMHG

## 2019-05-07 LAB
ANION GAP SERPL CALC-SCNC: 11 MMO/L — SIGNIFICANT CHANGE UP (ref 7–14)
BUN SERPL-MCNC: 7 MG/DL — SIGNIFICANT CHANGE UP (ref 7–23)
CALCIUM SERPL-MCNC: 9.1 MG/DL — SIGNIFICANT CHANGE UP (ref 8.4–10.5)
CHLORIDE SERPL-SCNC: 104 MMOL/L — SIGNIFICANT CHANGE UP (ref 98–107)
CO2 SERPL-SCNC: 23 MMOL/L — SIGNIFICANT CHANGE UP (ref 22–31)
CREAT SERPL-MCNC: 0.81 MG/DL — SIGNIFICANT CHANGE UP (ref 0.5–1.3)
GLUCOSE SERPL-MCNC: 105 MG/DL — HIGH (ref 70–99)
HCT VFR BLD CALC: 32 % — LOW (ref 34.5–45)
HGB BLD-MCNC: 10 G/DL — LOW (ref 11.5–15.5)
MAGNESIUM SERPL-MCNC: 2 MG/DL — SIGNIFICANT CHANGE UP (ref 1.6–2.6)
MCHC RBC-ENTMCNC: 25.8 PG — LOW (ref 27–34)
MCHC RBC-ENTMCNC: 31.3 % — LOW (ref 32–36)
MCV RBC AUTO: 82.5 FL — SIGNIFICANT CHANGE UP (ref 80–100)
NRBC # FLD: 0 K/UL — SIGNIFICANT CHANGE UP (ref 0–0)
PHOSPHATE SERPL-MCNC: 3.7 MG/DL — SIGNIFICANT CHANGE UP (ref 2.5–4.5)
PLATELET # BLD AUTO: 257 K/UL — SIGNIFICANT CHANGE UP (ref 150–400)
PMV BLD: 11.4 FL — SIGNIFICANT CHANGE UP (ref 7–13)
POTASSIUM SERPL-MCNC: 3.8 MMOL/L — SIGNIFICANT CHANGE UP (ref 3.5–5.3)
POTASSIUM SERPL-SCNC: 3.8 MMOL/L — SIGNIFICANT CHANGE UP (ref 3.5–5.3)
RBC # BLD: 3.88 M/UL — SIGNIFICANT CHANGE UP (ref 3.8–5.2)
RBC # FLD: 14.1 % — SIGNIFICANT CHANGE UP (ref 10.3–14.5)
SODIUM SERPL-SCNC: 138 MMOL/L — SIGNIFICANT CHANGE UP (ref 135–145)
WBC # BLD: 5.19 K/UL — SIGNIFICANT CHANGE UP (ref 3.8–10.5)
WBC # FLD AUTO: 5.19 K/UL — SIGNIFICANT CHANGE UP (ref 3.8–10.5)

## 2019-05-07 RX ORDER — POTASSIUM CHLORIDE 20 MEQ
20 PACKET (EA) ORAL ONCE
Qty: 0 | Refills: 0 | Status: COMPLETED | OUTPATIENT
Start: 2019-05-07 | End: 2019-05-07

## 2019-05-07 RX ORDER — OXYCODONE HYDROCHLORIDE 5 MG/1
1 TABLET ORAL
Qty: 24 | Refills: 0 | OUTPATIENT
Start: 2019-05-07 | End: 2019-05-10

## 2019-05-07 RX ORDER — DOCUSATE SODIUM 100 MG
1 CAPSULE ORAL
Qty: 0 | Refills: 0 | COMMUNITY
Start: 2019-05-07

## 2019-05-07 RX ORDER — SENNA PLUS 8.6 MG/1
2 TABLET ORAL
Qty: 0 | Refills: 0 | COMMUNITY
Start: 2019-05-07

## 2019-05-07 RX ADMIN — OXYCODONE HYDROCHLORIDE 5 MILLIGRAM(S): 5 TABLET ORAL at 16:38

## 2019-05-07 RX ADMIN — Medication 650 MILLIGRAM(S): at 10:26

## 2019-05-07 RX ADMIN — Medication 650 MILLIGRAM(S): at 11:13

## 2019-05-07 RX ADMIN — OXYCODONE HYDROCHLORIDE 5 MILLIGRAM(S): 5 TABLET ORAL at 17:29

## 2019-05-07 RX ADMIN — HYDROMORPHONE HYDROCHLORIDE 1 MILLIGRAM(S): 2 INJECTION INTRAMUSCULAR; INTRAVENOUS; SUBCUTANEOUS at 04:44

## 2019-05-07 RX ADMIN — Medication 100 MILLIGRAM(S): at 18:04

## 2019-05-07 RX ADMIN — Medication 100 MILLIGRAM(S): at 05:03

## 2019-05-07 RX ADMIN — HYDROMORPHONE HYDROCHLORIDE 1 MILLIGRAM(S): 2 INJECTION INTRAMUSCULAR; INTRAVENOUS; SUBCUTANEOUS at 03:56

## 2019-05-07 RX ADMIN — Medication 20 MILLIEQUIVALENT(S): at 08:38

## 2019-05-07 RX ADMIN — OXYCODONE HYDROCHLORIDE 5 MILLIGRAM(S): 5 TABLET ORAL at 01:35

## 2019-05-07 RX ADMIN — SODIUM CHLORIDE 3 MILLILITER(S): 9 INJECTION INTRAMUSCULAR; INTRAVENOUS; SUBCUTANEOUS at 05:03

## 2019-05-07 RX ADMIN — Medication 100 GRAM(S): at 04:55

## 2019-05-07 NOTE — PROGRESS NOTE ADULT - SUBJECTIVE AND OBJECTIVE BOX
Pt seen and examined. Doing well. No acute events o/n. Passing gas.     T(C): 36.8 (05-07-19 @ 04:54), Max: 37.2 (05-06-19 @ 10:24)  T(F): 98.2 (05-07-19 @ 04:54), Max: 98.9 (05-06-19 @ 10:24)  HR: 65 (05-07-19 @ 04:54) (65 - 89)  BP: 127/80 (05-07-19 @ 04:54) (118/66 - 137/81)  RR: 17 (05-07-19 @ 04:54) (17 - 18)  SpO2: 96% (05-07-19 @ 04:54) (96% - 100%)      06 May 2019 07:01  -  07 May 2019 07:00  --------------------------------------------------------  IN:  Total IN: 0 mL    OUT:    Bulb: 45 mL    Bulb: 79 mL    Bulb: 75 mL    Voided: 1750 mL  Total OUT: 1949 mL    Total NET: -1949 mL          Exam:  Abdomen soft  Appropriately tender  Prevena vac in place and holding sxn  JPs serosanguinous.                           10.0   5.19  )-----------( 257      ( 07 May 2019 05:35 )             32.0     05-07    138  |  104  |  7   ----------------------------<  105<H>  3.8   |  23  |  0.81    Ca    9.1      07 May 2019 05:35  Phos  3.7     05-07  Mg     2.0     05-07

## 2019-05-07 NOTE — PROGRESS NOTE ADULT - ASSESSMENT
ASSESSMENT:  Patient is a 49 year old female with a PMHx of uterine fibroids and history of hernia repair 4/2018 who presented with recurrent incisional hernia.  She is now S/P revision repair of incisional hernia using mesh and reconstruction of abdominal wall using flap on 5/2/19.      PLAN:   - Pain control  - Continue with regular diet  - Continue with IV Cefotetan until discharge per plastic surgery  - Discharge planning to [facility]  - Discussed with A Team      #[id number]  A Team Surgery ASSESSMENT:  Patient is a 49 year old female with a PMHx of uterine fibroids and history of hernia repair 4/2018 who presented with recurrent incisional hernia.  She is now S/P revision repair of incisional hernia using mesh and reconstruction of abdominal wall using flap on 5/2/19.      PLAN:   - Pain control  - Continue with regular diet  - Continue with IV Cefotetan until discharge per plastic surgery  - DVT prophylaxis with subcutaneous Lovenox  - Discharge planning to [facility]  - Discussed with A Team      #[id number]  A Team Surgery

## 2019-05-07 NOTE — PROGRESS NOTE ADULT - SUBJECTIVE AND OBJECTIVE BOX
S/P Repair of incisional hernia  POD # 5      Subjective: I am still having pain and burning in the lower abdomen, but I would prefer    Objective:   Vital Signs Last 24 Hrs  T(C): 36.9 (07 May 2019 13:30), Max: 37.2 (06 May 2019 22:11)  T(F): 98.4 (07 May 2019 13:30), Max: 99 (07 May 2019 10:11)  HR: 77 (07 May 2019 13:30) (65 - 86)  BP: 124/87 (07 May 2019 13:30) (113/68 - 137/81)  BP(mean): --  RR: 17 (07 May 2019 13:30) (17 - 18)  SpO2: 100% (07 May 2019 13:30) (96% - 100%)    Daily     Daily     Heent: N/C, AT PER no scleral icterus, No JVD  Pul: clear  Cor: RRR  Abdomen: soft, normal BS. Wound - clean  Extremities: without edema, motor/sensory intact    I&O's Detail    06 May 2019 07:01  -  07 May 2019 07:00  --------------------------------------------------------  IN:  Total IN: 0 mL    OUT:    Bulb: 45 mL    Bulb: 79 mL    Bulb: 75 mL    Voided: 1750 mL  Total OUT: 1949 mL    Total NET: -1949 mL      07 May 2019 07:01  -  07 May 2019 15:33  --------------------------------------------------------  IN:  Total IN: 0 mL    OUT:    Bulb: 5 mL    Bulb: 10 mL    Bulb: 7.5 mL    Voided: 200 mL  Total OUT: 222.5 mL    Total NET: -222.5 mL          MEDICATIONS  (STANDING):  cefoTEtan  IVPB 2 Gram(s) IV Intermittent every 12 hours  docusate sodium 100 milliGRAM(s) Oral two times a day  enoxaparin Injectable 40 milliGRAM(s) SubCutaneous every 24 hours  senna 2 Tablet(s) Oral at bedtime  sodium chloride 0.9% lock flush 3 milliLiter(s) IV Push every 8 hours    MEDICATIONS  (PRN):  acetaminophen   Tablet .. 650 milliGRAM(s) Oral every 6 hours PRN Mild Pain (1 - 3)  ibuprofen  Tablet. 400 milliGRAM(s) Oral every 6 hours PRN Mild Pain (1 - 3)  ondansetron Injectable 4 milliGRAM(s) IV Push once PRN Nausea and/or Vomiting  oxyCODONE    IR 5 milliGRAM(s) Oral every 6 hours PRN Moderate Pain (4 - 6)  oxyCODONE    IR 10 milliGRAM(s) Oral every 6 hours PRN Severe Pain (7 - 10)                            10.0   5.19  )-----------( 257      ( 07 May 2019 05:35 )             32.0     05-07    138  |  104  |  7   ----------------------------<  105<H>  3.8   |  23  |  0.81    Ca    9.1      07 May 2019 05:35  Phos  3.7     05-07  Mg     2.0     05-07          Radiologic Studies:

## 2019-05-07 NOTE — DISCHARGE NOTE NURSING/CASE MANAGEMENT/SOCIAL WORK - NSDCDPATPORTLINK_GEN_ALL_CORE
You can access the [facility], offered by [facility], by registering with the following website: [email or web address]

## 2019-05-07 NOTE — PROGRESS NOTE ADULT - SUBJECTIVE AND OBJECTIVE BOX
NAD, Pain Controlled, Ambulating  AVSS  PE: Abd closure intact, VAC in place       Umbo recon viable       Drains- serous output  Plan: Doing well          D/c home today with VAC          Follow up with Dr. Martin next Monday

## 2019-05-07 NOTE — PROGRESS NOTE ADULT - SUBJECTIVE AND OBJECTIVE BOX
Surgery Progress Note     HPI:  Patient is a 49 year old female with a PMHx of uterine fibroids and history of hernia repair 2018 who presented with recurrent incisional hernia.       PAST MEDICAL & SURGICAL HISTORY:  Fibroid uterus  Incisional hernia  Obesity  H/O:   H/O myomectomy: 2016  H/O hernia repair: 2018 - with mesh      ALLERGIES:   Penicillin (Vomiting)      --------------------------------------------------------------------------------------    MEDICATIONS:     Neurologic Medications  acetaminophen   Tablet .. 650 milliGRAM(s) Oral every 6 hours PRN Mild Pain (1 - 3)  HYDROmorphone  Injectable 1 milliGRAM(s) IV Push every 6 hours PRN Severe Pain (7 - 10)  ibuprofen  Tablet. 400 milliGRAM(s) Oral every 6 hours PRN Mild Pain (1 - 3)  ondansetron Injectable 4 milliGRAM(s) IV Push once PRN Nausea and/or Vomiting  oxyCODONE    IR 5 milliGRAM(s) Oral every 6 hours PRN Moderate Pain (4 - 6)  oxyCODONE    IR 10 milliGRAM(s) Oral every 6 hours PRN Severe Pain (7 - 10)    Gastrointestinal Medications  docusate sodium 100 milliGRAM(s) Oral two times a day  senna 2 Tablet(s) Oral at bedtime  sodium chloride 0.9% lock flush 3 milliLiter(s) IV Push every 8 hours    Hematologic/Oncologic Medications  enoxaparin Injectable 40 milliGRAM(s) SubCutaneous every 24 hours    Antimicrobial/Immunologic Medications  cefoTEtan  IVPB 2 Gram(s) IV Intermittent every 12 hours    --------------------------------------------------------------------------------------    VITAL SIGNS:  T(C): 36.8 (07 May 2019 04:54), Max: 37.2 (06 May 2019 10:24)  T(F): 98.2 (07 May 2019 04:54), Max: 98.9 (06 May 2019 10:24)  HR: 65 (07 May 2019 04:54) (65 - 89)  BP: 127/80 (07 May 2019 04:54) (118/66 - 137/81)  RR: 17 (07 May 2019 04:54) (17 - 18)  SpO2: 96% (07 May 2019 04:54) (96% - 100%)    --------------------------------------------------------------------------------------    INS AND OUTS:  06 May 2019 07:01  -  07 May 2019 07:00  --------------------------------------------------------  IN:  Total IN: 0 mL    OUT:    Bulb: 45 mL    Bulb: 79 mL    Bulb: 75 mL    Voided: 1750 mL  Total OUT: 1949 mL    Total NET: -1949 mL    --------------------------------------------------------------------------------------    EXAM      --------------------------------------------------------------------------------------    METABOLIC/FLUIDS/ELECTROLYTES  sodium chloride 0.9% lock flush 3 milliLiter(s) IV Push every 8 hours    HEMATOLOGIC  [x] VTE Prophylaxis: enoxaparin Injectable 40 milliGRAM(s) SubCutaneous every 24 hours    INFECTIOUS DISEASE  Antimicrobials/Immunologic Medications:  cefoTEtan  IVPB 2 Gram(s) IV Intermittent every 12 hours    --------------------------------------------------------------------------------------    LABS    CBC:                      10.0   5.19  )-----------( 257      ( 07 May 2019 05:35 )             32.0       CHEM:  138  |  104  |  7   ----------------------------<  105<H>  3.8   |  23  |  0.81    Ca    9.1      07 May 2019 05:35  Phos  3.7     05-07  Mg     2.0     05-07    --------------------------------------------------------------------------------------

## 2019-05-07 NOTE — PROGRESS NOTE ADULT - ASSESSMENT
49 year old woman s/p repair of incisional hernia  Still having pain control issues, either too strong or not strong enough. Unable to sleep well in the hospital, wishes to go home.  I agree with the patient's decision, particularly since PT found her to be fit for discharge.  She will be discharged home both with NSAIDs and oxycodone. She was instructed to use them depending on her level of pain.    She will return to Dr. Martin's office in one week and to my office in 2 weeks.

## 2021-11-07 ENCOUNTER — TRANSCRIPTION ENCOUNTER (OUTPATIENT)
Age: 51
End: 2021-11-07

## 2021-12-02 ENCOUNTER — TRANSCRIPTION ENCOUNTER (OUTPATIENT)
Age: 51
End: 2021-12-02

## 2023-08-21 NOTE — PATIENT PROFILE ADULT - NSPROPOAURINARYCATHETER_GEN_A_NUR
yes Electrodesiccation Text: The wound bed was treated with electrodesiccation after the biopsy was performed. Discharged
